# Patient Record
Sex: FEMALE | Race: OTHER | Employment: UNEMPLOYED | URBAN - METROPOLITAN AREA
[De-identification: names, ages, dates, MRNs, and addresses within clinical notes are randomized per-mention and may not be internally consistent; named-entity substitution may affect disease eponyms.]

---

## 2023-02-02 NOTE — PROGRESS NOTES
Malcolm Corona is a 79 y.o. female is seen on 2/3/2023 for Establish Care, Elevated Blood Pressure, and Fatigue    Assessment & Plan:     1. Dyspnea on exertion  Assessment & Plan:  Check chest PA and lateral  Check echo to r/o HF, worsening cardiac function  Orders:  -     CBC WITH AUTOMATED DIFF; Future  -     METABOLIC PANEL, COMPREHENSIVE; Future  -     ECHO ADULT COMPLETE; Future  -     XR CHEST PA LAT; Future  2. Chest pain, unspecified type  Assessment & Plan:  Check EKG and echo  Referral to cardiology  Orders:  -     EKG, 12 LEAD, INITIAL; Future  -     REFERRAL TO CARDIOLOGY  3. Swelling of lower leg  Assessment & Plan:  Continue current regimen  Check bilateral venous ultrasound to rule out PVD  Orders:  -     DUPLEX LOWER EXT VENOUS BILAT; Future  4. Essential hypertension  Assessment & Plan:  Elevated, goal <130/80  Increased lisinopril to 40 mg daily  Re-evaluate in 4 weeks  If remains elevated, may consider amlodipine 5 mg daily vs increasing coreg to 12.5 mg BID  Orders:  -     CBC WITH AUTOMATED DIFF; Future  -     lisinopriL (PRINIVIL, ZESTRIL) 40 mg tablet; Take 1 Tablet by mouth daily. , Normal, Disp-90 Tablet, R-1  5. Hyperlipidemia associated with type 2 diabetes mellitus ([facility] [address]  Assessment & Plan:  Goal <70  Continue current regimen  Check cmp and lipid panel  Orders:  -     LIPID PANEL; Future  6. Type 2 diabetes mellitus with hyperglycemia, without long-term current use of insulin (HCC)  Assessment & Plan:  Continue current regimen  Check a1c  Orders:  -     CBC WITH AUTOMATED DIFF; Future  -     HEMOGLOBIN A1C WITH EAG; Future  7. Vitamin D deficiency  Assessment & Plan:  Check vitamin D level  Orders:  -     VITAMIN D, 25 HYDROXY; Future  8. Need for hepatitis C screening test  -     HEPATITIS C AB; Future  9. Encounter to establish care    Follow-up and Dispositions    Return in about 4 weeks (around 3/3/2023) for BLOOD PRESSURE, DIABETES, LAB RESULTS, constipation, incontinence. Subjective:     HPI    Pt speaks the language, Sinhala the  services were used to conduct this visit.  number Sandy Patches. Previous PCP: was seeing doctor in [place], will stay awhile with son    Shortness of breath  Onset: been going on for 2 months  Increased when lay on back, feel difficulty breathing  Of course without laying back up  Feels like somebody's choking up  Has SOB with moving around  Aggravating factors: Hookah smoke, incense  Non-smoker  Has chest pain  Went to the ER and checked the heart and it was normal; a month ago    Chest pain  The pain radiates to the left pain  Chest pain: 4-5/10  Location: middle of chest, left side  Has a little bit of pain  History of MI: none    Feet are swollen  Taking lasix, feet are swollen  Eating a lot of salt  Treatment: 40 mg furosemide   Has not had US performed    Hypertension  Symptoms:  chest pain  BP readings at home are : did not check blood pressure at home;   Comorbid: HLD, DM  Current treatment: carvedilol 6.25 mg BID, furosemide 40 mg daily, lisinopril 20 mg daily    Hyperlipidemia  Compliant with meds: yes  Comorbid: HTN, DM  Current treatment: vascepa 1 gram daily, rosuvastatin 20 mg daily    Type 2 DM-  Home BG readings range from : was high  Hypoglycemia: sometimes  On statin: rosuvastatin 20 mg daily  Comorbid: HLD, HTN  Followed by endocrine: none  Current treatment: glimepiride 4 mg BID, insulin glargine 40 units daily, metformin 1000 mg BID    Vitamin D deficiency  Fatigue: yes  Weakness: no  Treatment: vitamin D3 50,000 units once a week    Review of Systems   Constitutional:  Negative for chills, fever and malaise/fatigue. Respiratory:  Positive for shortness of breath. Negative for cough. Cardiovascular:  Positive for chest pain, leg swelling and PND.      Objective:   BP (!) 171/80 Comment: left arm manual  Pulse 71   Temp 98.2 °F (36.8 °C) (Oral)   Resp 16   Ht 5' 4.5\" (1.638 m)   Wt 211 lb (95.7 kg)   SpO2 100%   BMI 35.66 kg/m²     Physical Exam  Vitals and nursing note reviewed. Constitutional:       General: She is not in acute distress. Appearance: She is not ill-appearing. HENT:      Head: Normocephalic and atraumatic. Cardiovascular:      Rate and Rhythm: Normal rate and regular rhythm. Pulmonary:      Effort: Pulmonary effort is normal. No respiratory distress. Breath sounds: No wheezing, rhonchi or rales. Musculoskeletal:      Right lower leg: No tenderness. 2+ Edema present. Left lower leg: No tenderness. 2+ Edema present. Right ankle: Swelling present. Normal pulse. Left ankle: Swelling present. Normal pulse. Skin:     General: Skin is warm and dry. Neurological:      General: No focal deficit present. Mental Status: She is alert and oriented to person, place, and time. Psychiatric:         Mood and Affect: Mood normal.         Thought Content:  Thought content normal.         Judgment: Judgment normal.      Total time spent: 60 minutes    ENOC Lombardi

## 2023-02-03 ENCOUNTER — OFFICE VISIT (OUTPATIENT)
Dept: FAMILY MEDICINE CLINIC | Age: 68
End: 2023-02-03
Payer: MEDICARE

## 2023-02-03 VITALS
DIASTOLIC BLOOD PRESSURE: 80 MMHG | TEMPERATURE: 98.2 F | SYSTOLIC BLOOD PRESSURE: 171 MMHG | BODY MASS INDEX: 35.16 KG/M2 | RESPIRATION RATE: 16 BRPM | HEIGHT: 65 IN | WEIGHT: 211 LBS | OXYGEN SATURATION: 100 % | HEART RATE: 71 BPM

## 2023-02-03 DIAGNOSIS — R07.9 CHEST PAIN, UNSPECIFIED TYPE: ICD-10-CM

## 2023-02-03 DIAGNOSIS — R06.09 DYSPNEA ON EXERTION: Primary | ICD-10-CM

## 2023-02-03 DIAGNOSIS — M79.89 SWELLING OF LOWER LEG: ICD-10-CM

## 2023-02-03 DIAGNOSIS — I10 ESSENTIAL HYPERTENSION: ICD-10-CM

## 2023-02-03 DIAGNOSIS — E11.69 HYPERLIPIDEMIA ASSOCIATED WITH TYPE 2 DIABETES MELLITUS (HCC): ICD-10-CM

## 2023-02-03 DIAGNOSIS — E55.9 VITAMIN D DEFICIENCY: ICD-10-CM

## 2023-02-03 DIAGNOSIS — Z76.89 ENCOUNTER TO ESTABLISH CARE: ICD-10-CM

## 2023-02-03 DIAGNOSIS — E78.5 HYPERLIPIDEMIA ASSOCIATED WITH TYPE 2 DIABETES MELLITUS (HCC): ICD-10-CM

## 2023-02-03 DIAGNOSIS — Z11.59 NEED FOR HEPATITIS C SCREENING TEST: ICD-10-CM

## 2023-02-03 DIAGNOSIS — E11.65 TYPE 2 DIABETES MELLITUS WITH HYPERGLYCEMIA, WITHOUT LONG-TERM CURRENT USE OF INSULIN (HCC): ICD-10-CM

## 2023-02-03 PROBLEM — I21.A1 TYPE 2 MI (MYOCARDIAL INFARCTION) (HCC): Status: ACTIVE | Noted: 2022-12-15

## 2023-02-03 PROBLEM — R06.02 SHORTNESS OF BREATH: Status: ACTIVE | Noted: 2023-02-03

## 2023-02-03 RX ORDER — GLIMEPIRIDE 4 MG/1
4 TABLET ORAL 2 TIMES DAILY
COMMUNITY

## 2023-02-03 RX ORDER — LANOLIN ALCOHOL/MO/W.PET/CERES
1000 CREAM (GRAM) TOPICAL DAILY
COMMUNITY

## 2023-02-03 RX ORDER — LISINOPRIL 20 MG/1
20 TABLET ORAL DAILY
COMMUNITY
End: 2023-02-03 | Stop reason: SDUPTHER

## 2023-02-03 RX ORDER — ASPIRIN 81 MG/1
81 TABLET ORAL DAILY
COMMUNITY

## 2023-02-03 RX ORDER — LISINOPRIL 40 MG/1
40 TABLET ORAL DAILY
Qty: 90 TABLET | Refills: 1 | Status: SHIPPED | OUTPATIENT
Start: 2023-02-03

## 2023-02-03 RX ORDER — CARVEDILOL 6.25 MG/1
TABLET ORAL 2 TIMES DAILY WITH MEALS
COMMUNITY

## 2023-02-03 RX ORDER — ICOSAPENT ETHYL 1000 MG/1
1 CAPSULE ORAL DAILY
COMMUNITY

## 2023-02-03 RX ORDER — FUROSEMIDE 40 MG/1
40 TABLET ORAL DAILY
COMMUNITY

## 2023-02-03 RX ORDER — ROSUVASTATIN CALCIUM 20 MG/1
20 TABLET, COATED ORAL DAILY
COMMUNITY

## 2023-02-03 RX ORDER — ASPIRIN 325 MG
50000 TABLET, DELAYED RELEASE (ENTERIC COATED) ORAL
COMMUNITY

## 2023-02-03 RX ORDER — OXYBUTYNIN CHLORIDE 5 MG/1
5 TABLET ORAL DAILY
COMMUNITY

## 2023-02-03 RX ORDER — ALBUTEROL SULFATE 90 UG/1
1-2 AEROSOL, METERED RESPIRATORY (INHALATION)
COMMUNITY
Start: 2022-12-19

## 2023-02-03 RX ORDER — METFORMIN HYDROCHLORIDE 1000 MG/1
1000 TABLET ORAL 2 TIMES DAILY WITH MEALS
COMMUNITY

## 2023-02-03 NOTE — ASSESSMENT & PLAN NOTE
Elevated, goal <130/80  Increased lisinopril to 40 mg daily  Re-evaluate in 4 weeks  If remains elevated, may consider amlodipine 5 mg daily vs increasing coreg to 12.5 mg BID

## 2023-02-03 NOTE — PROGRESS NOTES
Nino De Anda presents today for   Chief Complaint   Patient presents with    Establish Care    Elevated Blood Pressure    Fatigue       Is someone accompanying this pt? Janay Levy    Is the patient using any DME equipment during OV? no    Depression Screening:  3 most recent PHQ Screens 2/3/2023   Little interest or pleasure in doing things Not at all   Feeling down, depressed, irritable, or hopeless Not at all   Total Score PHQ 2 0       Learning Assessment:  Learning Assessment 2/3/2023   PRIMARY LEARNER Patient   PRIMARY LANGUAGE OTHER (COMMENT)   LEARNER PREFERENCE PRIMARY DEMONSTRATION   ANSWERED BY patient   RELATIONSHIP SELF       Abuse Screening:  Abuse Screening Questionnaire 2/3/2023   Do you ever feel afraid of your partner? N   Are you in a relationship with someone who physically or mentally threatens you? N   Is it safe for you to go home? Y       Fall Screening  Fall Risk Assessment, last 12 mths 2/3/2023   Able to walk? Yes   Fall in past 12 months? 0   Do you feel unsteady? 0   Are you worried about falling 0       Generalized Anxiety  No flowsheet data found. Health Maintenance Due   Topic Date Due    Hepatitis C Screening  Never done    Depression Screen  Never done    COVID-19 Vaccine (1) Never done    Lipid Screen  Never done    DTaP/Tdap/Td series (1 - Tdap) Never done    Colorectal Cancer Screening Combo  Never done    Shingles Vaccine (1 of 2) Never done    Breast Cancer Screen Mammogram  Never done    Bone Densitometry (Dexa) Screening  Never done    Pneumococcal 65+ years (1 - PCV) Never done    Flu Vaccine (1) Never done    Medicare Yearly Exam  Never done   . Health Maintenance reviewed and discussed and ordered per Provider. Advance Directive:  1. Do you have an advance directive in place?  Patient Reply:no

## 2023-02-04 ENCOUNTER — HOSPITAL ENCOUNTER (OUTPATIENT)
Dept: LAB | Age: 68
End: 2023-02-04
Payer: MEDICARE

## 2023-02-04 ENCOUNTER — HOSPITAL ENCOUNTER (OUTPATIENT)
Dept: GENERAL RADIOLOGY | Age: 68
End: 2023-02-04
Payer: MEDICARE

## 2023-02-04 DIAGNOSIS — R07.9 CHEST PAIN, UNSPECIFIED TYPE: ICD-10-CM

## 2023-02-04 DIAGNOSIS — I10 ESSENTIAL HYPERTENSION: ICD-10-CM

## 2023-02-04 DIAGNOSIS — Z11.59 NEED FOR HEPATITIS C SCREENING TEST: ICD-10-CM

## 2023-02-04 DIAGNOSIS — E11.69 HYPERLIPIDEMIA ASSOCIATED WITH TYPE 2 DIABETES MELLITUS (HCC): ICD-10-CM

## 2023-02-04 DIAGNOSIS — R06.09 DYSPNEA ON EXERTION: ICD-10-CM

## 2023-02-04 DIAGNOSIS — E78.5 HYPERLIPIDEMIA ASSOCIATED WITH TYPE 2 DIABETES MELLITUS (HCC): ICD-10-CM

## 2023-02-04 DIAGNOSIS — E11.65 TYPE 2 DIABETES MELLITUS WITH HYPERGLYCEMIA, WITHOUT LONG-TERM CURRENT USE OF INSULIN (HCC): ICD-10-CM

## 2023-02-04 DIAGNOSIS — E55.9 VITAMIN D DEFICIENCY: ICD-10-CM

## 2023-02-04 LAB
25(OH)D3 SERPL-MCNC: 43 NG/ML (ref 30–100)
ALBUMIN SERPL-MCNC: 3 G/DL (ref 3.4–5)
ALBUMIN/GLOB SERPL: 0.9 (ref 0.8–1.7)
ALP SERPL-CCNC: 51 U/L (ref 45–117)
ALT SERPL-CCNC: 24 U/L (ref 13–56)
ANION GAP SERPL CALC-SCNC: 3 MMOL/L (ref 3–18)
AST SERPL-CCNC: 23 U/L (ref 10–38)
ATRIAL RATE: 70 BPM
BASOPHILS # BLD: 0 K/UL (ref 0–0.1)
BASOPHILS NFR BLD: 1 % (ref 0–2)
BILIRUB SERPL-MCNC: 0.3 MG/DL (ref 0.2–1)
BUN SERPL-MCNC: 20 MG/DL (ref 7–18)
BUN/CREAT SERPL: 17 (ref 12–20)
CALCIUM SERPL-MCNC: 8.4 MG/DL (ref 8.5–10.1)
CALCULATED P AXIS, ECG09: 41 DEGREES
CALCULATED R AXIS, ECG10: 3 DEGREES
CALCULATED T AXIS, ECG11: 60 DEGREES
CHLORIDE SERPL-SCNC: 106 MMOL/L (ref 100–111)
CHOLEST SERPL-MCNC: 118 MG/DL
CO2 SERPL-SCNC: 30 MMOL/L (ref 21–32)
CREAT SERPL-MCNC: 1.17 MG/DL (ref 0.6–1.3)
DIAGNOSIS, 93000: NORMAL
DIFFERENTIAL METHOD BLD: ABNORMAL
EOSINOPHIL # BLD: 0.2 K/UL (ref 0–0.4)
EOSINOPHIL NFR BLD: 6 % (ref 0–5)
ERYTHROCYTE [DISTWIDTH] IN BLOOD BY AUTOMATED COUNT: 13.3 % (ref 11.6–14.5)
EST. AVERAGE GLUCOSE BLD GHB EST-MCNC: 246 MG/DL
GLOBULIN SER CALC-MCNC: 3.3 G/DL (ref 2–4)
GLUCOSE SERPL-MCNC: 226 MG/DL (ref 74–99)
HBA1C MFR BLD: 10.2 % (ref 4.2–5.6)
HCT VFR BLD AUTO: 30.8 % (ref 35–45)
HDLC SERPL-MCNC: 37 MG/DL (ref 40–60)
HDLC SERPL: 3.2 (ref 0–5)
HGB BLD-MCNC: 9.8 G/DL (ref 12–16)
IMM GRANULOCYTES # BLD AUTO: 0 K/UL (ref 0–0.04)
IMM GRANULOCYTES NFR BLD AUTO: 0 % (ref 0–0.5)
LDLC SERPL CALC-MCNC: 47 MG/DL (ref 0–100)
LIPID PROFILE,FLP: ABNORMAL
LYMPHOCYTES # BLD: 1.4 K/UL (ref 0.9–3.6)
LYMPHOCYTES NFR BLD: 34 % (ref 21–52)
MCH RBC QN AUTO: 25.3 PG (ref 24–34)
MCHC RBC AUTO-ENTMCNC: 31.8 G/DL (ref 31–37)
MCV RBC AUTO: 79.4 FL (ref 78–100)
MONOCYTES # BLD: 0.3 K/UL (ref 0.05–1.2)
MONOCYTES NFR BLD: 8 % (ref 3–10)
NEUTS SEG # BLD: 2 K/UL (ref 1.8–8)
NEUTS SEG NFR BLD: 51 % (ref 40–73)
NRBC # BLD: 0 K/UL (ref 0–0.01)
NRBC BLD-RTO: 0 PER 100 WBC
P-R INTERVAL, ECG05: 148 MS
PLATELET # BLD AUTO: 188 K/UL (ref 135–420)
PMV BLD AUTO: 11.2 FL (ref 9.2–11.8)
POTASSIUM SERPL-SCNC: 5.5 MMOL/L (ref 3.5–5.5)
PROT SERPL-MCNC: 6.3 G/DL (ref 6.4–8.2)
Q-T INTERVAL, ECG07: 398 MS
QRS DURATION, ECG06: 88 MS
QTC CALCULATION (BEZET), ECG08: 429 MS
RBC # BLD AUTO: 3.88 M/UL (ref 4.2–5.3)
SODIUM SERPL-SCNC: 139 MMOL/L (ref 136–145)
TRIGL SERPL-MCNC: 170 MG/DL (ref ?–150)
VENTRICULAR RATE, ECG03: 70 BPM
VLDLC SERPL CALC-MCNC: 34 MG/DL
WBC # BLD AUTO: 4 K/UL (ref 4.6–13.2)

## 2023-02-04 PROCEDURE — 85025 COMPLETE CBC W/AUTO DIFF WBC: CPT

## 2023-02-04 PROCEDURE — 93005 ELECTROCARDIOGRAM TRACING: CPT

## 2023-02-04 PROCEDURE — 71046 X-RAY EXAM CHEST 2 VIEWS: CPT

## 2023-02-04 PROCEDURE — 83036 HEMOGLOBIN GLYCOSYLATED A1C: CPT

## 2023-02-04 PROCEDURE — 86803 HEPATITIS C AB TEST: CPT

## 2023-02-04 PROCEDURE — 82306 VITAMIN D 25 HYDROXY: CPT

## 2023-02-04 PROCEDURE — 80061 LIPID PANEL: CPT

## 2023-02-04 PROCEDURE — 36415 COLL VENOUS BLD VENIPUNCTURE: CPT

## 2023-02-04 PROCEDURE — 80053 COMPREHEN METABOLIC PANEL: CPT

## 2023-02-06 LAB
HCV AB SER IA-ACNC: 0.02 INDEX
HCV AB SERPL QL IA: NEGATIVE
HCV COMMENT,HCGAC: NORMAL

## 2023-02-07 NOTE — PROGRESS NOTES
Please let pt know H/H is low. I referred pt to heme/onc. Start ferrous sulfate 325 mg daily until seen by heme/onc. Watch for s/s of bleeding, and avoid nsaids. Will review rest of labs at follow up appt on 3/3/23. Thank you!

## 2023-02-28 ENCOUNTER — TELEPHONE (OUTPATIENT)
Age: 68
End: 2023-02-28

## 2023-03-02 PROBLEM — N18.31 STAGE 3A CHRONIC KIDNEY DISEASE (HCC): Status: ACTIVE | Noted: 2023-03-02

## 2023-03-02 PROBLEM — D72.819 LEUKOPENIA: Status: ACTIVE | Noted: 2023-03-02

## 2023-03-02 PROBLEM — D64.9 ANEMIA: Status: ACTIVE | Noted: 2023-03-02

## 2023-03-02 RX ORDER — ALBUTEROL SULFATE 90 UG/1
2 AEROSOL, METERED RESPIRATORY (INHALATION) EVERY 4 HOURS
COMMUNITY
Start: 2022-12-20 | End: 2023-03-03 | Stop reason: ALTCHOICE

## 2023-03-02 RX ORDER — BENZONATATE 100 MG/1
1 CAPSULE ORAL EVERY 8 HOURS PRN
COMMUNITY
Start: 2022-12-20 | End: 2023-03-03 | Stop reason: ALTCHOICE

## 2023-03-02 RX ORDER — LANOLIN ALCOHOL/MO/W.PET/CERES
325 CREAM (GRAM) TOPICAL
COMMUNITY
Start: 2023-02-07 | End: 2023-03-03 | Stop reason: SDUPTHER

## 2023-03-02 RX ORDER — FUROSEMIDE 40 MG/1
40 TABLET ORAL PRN
COMMUNITY
Start: 2022-12-19 | End: 2023-03-03 | Stop reason: ALTCHOICE

## 2023-03-02 RX ORDER — CARVEDILOL 6.25 MG/1
1 TABLET ORAL DAILY
COMMUNITY
Start: 2023-01-27 | End: 2023-03-03 | Stop reason: ALTCHOICE

## 2023-03-02 RX ORDER — ROSUVASTATIN CALCIUM 20 MG/1
20 TABLET, COATED ORAL DAILY
COMMUNITY

## 2023-03-02 RX ORDER — LISINOPRIL 40 MG/1
1 TABLET ORAL DAILY
COMMUNITY
Start: 2023-02-03

## 2023-03-02 RX ORDER — OXYBUTYNIN CHLORIDE 5 MG/1
5 TABLET ORAL DAILY
COMMUNITY

## 2023-03-02 RX ORDER — ASPIRIN 81 MG/1
81 TABLET ORAL DAILY
COMMUNITY
End: 2023-03-03 | Stop reason: ALTCHOICE

## 2023-03-02 RX ORDER — GLIMEPIRIDE 4 MG/1
4 TABLET ORAL 2 TIMES DAILY
COMMUNITY
End: 2023-03-03 | Stop reason: ALTCHOICE

## 2023-03-02 RX ORDER — SENNOSIDES 8.6 MG/1
TABLET, COATED ORAL
COMMUNITY
Start: 2022-12-20

## 2023-03-02 RX ORDER — ICOSAPENT ETHYL 1000 MG/1
1 CAPSULE ORAL DAILY
COMMUNITY
End: 2023-03-03 | Stop reason: ALTCHOICE

## 2023-03-02 ASSESSMENT — ENCOUNTER SYMPTOMS: SHORTNESS OF BREATH: 0

## 2023-03-02 NOTE — ASSESSMENT & PLAN NOTE
LDL wnl, goal <70  Elevated triglycerides, advised pt on lifestyle modifications  Recheck lipid panel in 3 months

## 2023-03-02 NOTE — PROGRESS NOTES
Chief Complaint   Patient presents with    Follow-up    Edema     Fluid, Loss of kidney function    Anxiety    Dizziness     All the time     Assessment & Plan:     1. Essential hypertension  Assessment & Plan:  Elevated, goal < 130/80  Start amlodipine 5 mg daily  Continue carvedilol 6.25 mg BID, and lisinopril 40 mg daily  Possible medication compliance issue. Advised pt to take medications as prescribed. Re-evaluate in 1 week. If no improvement, may increase amlodipine to 10 mg daily  Advised pt and son to go to the emergency room if BP readings are over 180/120. Orders:  -     Comprehensive Metabolic Panel; Future  -     amLODIPine (NORVASC) 5 MG tablet; Take 1 tablet by mouth daily, Disp-90 tablet, R-1Normal  2. Stage 3a chronic kidney disease ([facility] [address]  Assessment & Plan:  Advised pt to avoid NSAIDS and increase hydration  Recheck cmp in 3 months  Orders:  -     CBC; Future  3. Hyperlipidemia associated with type 2 diabetes mellitus ([facility] [address]  Assessment & Plan:  LDL wnl, goal <70  Elevated triglycerides, advised pt on lifestyle modifications  Recheck lipid panel in 3 months  4. Type 2 diabetes mellitus with hyperglycemia, without long-term current use of insulin (HCC)  Assessment & Plan:  A1c elevated  Issues with medication compliance, advised pt on medication compliance  Continue current regimen  Referral to pharmD for management  Orders:  -     Microalbumin / Creatinine Urine Ratio; Future  -     Hemoglobin A1C; Future  -     [facility] - Pharmacist HR Facilities-Noel Mari ([facility])  5. Anemia, unspecified type  Assessment & Plan:  Continue ferrous sulfate 325 mg daily  Recheck cbc in 3 months  Orders:  -     CBC; Future  -     ferrous sulfate (FE TABS 325) 325 (65 Fe) MG EC tablet; Take 1 tablet by mouth every morning (before breakfast), Disp-90 tablet, R-1Normal  6. Encounter to discuss test results    Follow-up and Dispositions    Return in about 1 week (around 3/10/2023) for blood pressure.     Subjective:     HPI     Pt speaks the language, Italian, the  services were used to conduct this visit.  number #[id number], Ziad.    Hypertension  Symptoms:  no chest pain, occasionally dizziness, If she bends over, or lays down on her back, feels dizzy  BP readings at home are : 's at home  Comorbid: HLD, DM  Current treatment: carvedilol 6.25 mg BID, furosemide 40 mg daily PRN, lisinopril 40 mg daily    CKD  Stage of Chronic Kidney Disease III   Denies any BLE swelling, SOB, chest pain  NSAID use: none  Risk factors/Comorbid: none  Followed by nephrology: when was in [place], saw a kidney doctor, had in infection to her kidneys  When took diuretic pills, states she had an infection in her kidney    Hyperlipidemia  Compliant with meds: yes  Comorbid: HTN, DM  Current treatment: vascepa 1 gram daily, rosuvastatin 20 mg daily  Hypertriglyceridemia    Type 2 DM  Home BG readings range from : blood sugar was 80 today  Hypoglycemia: sometimes  On statin: rosuvastatin 20 mg daily  Comorbid: HLD, HTN  Followed by endocrine: none  Current treatment: glimepiride 4 mg BID, insulin glargine 40 units daily (but states she does not take it every day), states blood sugar was dropping with insulin, metformin 1000 mg BID (is taking metformin once day)   States when she's taking her pills and taking insulin her blood sugar drops, drops to 70's    Anemia  Current symptoms:  No bleeding  Fatigue: yes  Treatment: ferrous sulfate 325 mg daily    Review of Systems   Respiratory:  Negative for shortness of breath.    Cardiovascular:  Negative for chest pain and leg swelling.   Neurological:  Positive for dizziness. Negative for light-headedness and headaches.     Objective:     Vitals:    03/03/23 1117 03/03/23 1139 03/03/23 1150   BP: (!) 208/74 (!) 182/84 (!) 191/75   Site: Right Upper Arm Left Upper Arm    Position: Sitting Sitting    Cuff Size: Medium Adult Medium Adult    Pulse: 77     Resp: 16    Temp: 98.1 °F (36.7 °C)     TempSrc: Oral     SpO2: 100%     Weight: 220 lb (99.8 kg)     Height: 5' 4\" (1.626 m)       Physical Exam  Vitals and nursing note reviewed. Constitutional:       General: She is not in acute distress. Appearance: She is not ill-appearing. HENT:      Head: Normocephalic and atraumatic. Cardiovascular:      Rate and Rhythm: Normal rate and regular rhythm. Pulmonary:      Effort: Pulmonary effort is normal. No respiratory distress. Breath sounds: No wheezing, rhonchi or rales. Musculoskeletal:         General: Normal range of motion. Skin:     General: Skin is warm and dry. Neurological:      General: No focal deficit present. Mental Status: She is alert. Psychiatric:         Mood and Affect: Mood normal.         Thought Content:  Thought content normal.         Judgment: Judgment normal.     Total time spent: 45 minutes    ANNIKA Grant

## 2023-03-02 NOTE — ASSESSMENT & PLAN NOTE
Elevated, goal < 130/80  Start amlodipine 5 mg daily  Continue carvedilol 6.25 mg BID, and lisinopril 40 mg daily  Possible medication compliance issue. Advised pt to take medications as prescribed. Re-evaluate in 1 week. If no improvement, may increase amlodipine to 10 mg daily  Advised pt and son to go to the emergency room if BP readings are over 180/120.

## 2023-03-02 NOTE — ASSESSMENT & PLAN NOTE
A1c elevated  Issues with medication compliance, advised pt on medication compliance  Continue current regimen  Referral to pharmD for management   36.9

## 2023-03-03 ENCOUNTER — HOSPITAL ENCOUNTER (OUTPATIENT)
Facility: HOSPITAL | Age: 68
End: 2023-03-03
Payer: COMMERCIAL

## 2023-03-03 ENCOUNTER — OFFICE VISIT (OUTPATIENT)
Age: 68
End: 2023-03-03
Payer: COMMERCIAL

## 2023-03-03 ENCOUNTER — HOSPITAL ENCOUNTER (OUTPATIENT)
Facility: HOSPITAL | Age: 68
Setting detail: SPECIMEN
End: 2023-03-03
Payer: COMMERCIAL

## 2023-03-03 VITALS
RESPIRATION RATE: 16 BRPM | HEIGHT: 64 IN | SYSTOLIC BLOOD PRESSURE: 191 MMHG | DIASTOLIC BLOOD PRESSURE: 75 MMHG | OXYGEN SATURATION: 100 % | BODY MASS INDEX: 37.56 KG/M2 | WEIGHT: 220 LBS | HEART RATE: 77 BPM | TEMPERATURE: 98.1 F

## 2023-03-03 DIAGNOSIS — I10 ESSENTIAL HYPERTENSION: Primary | ICD-10-CM

## 2023-03-03 DIAGNOSIS — N18.31 STAGE 3A CHRONIC KIDNEY DISEASE (HCC): ICD-10-CM

## 2023-03-03 DIAGNOSIS — Z71.2 ENCOUNTER TO DISCUSS TEST RESULTS: ICD-10-CM

## 2023-03-03 DIAGNOSIS — E78.5 HYPERLIPIDEMIA ASSOCIATED WITH TYPE 2 DIABETES MELLITUS (HCC): ICD-10-CM

## 2023-03-03 DIAGNOSIS — E11.65 TYPE 2 DIABETES MELLITUS WITH HYPERGLYCEMIA, WITHOUT LONG-TERM CURRENT USE OF INSULIN (HCC): ICD-10-CM

## 2023-03-03 DIAGNOSIS — E11.69 HYPERLIPIDEMIA ASSOCIATED WITH TYPE 2 DIABETES MELLITUS (HCC): ICD-10-CM

## 2023-03-03 DIAGNOSIS — D64.9 ANEMIA, UNSPECIFIED TYPE: ICD-10-CM

## 2023-03-03 LAB
ALBUMIN SERPL-MCNC: 3 G/DL (ref 3.4–5)
ALBUMIN/GLOB SERPL: 0.9 (ref 0.8–1.7)
ALP SERPL-CCNC: 57 U/L (ref 45–117)
ALT SERPL-CCNC: 28 U/L (ref 13–56)
ANION GAP SERPL CALC-SCNC: 3 MMOL/L (ref 3–18)
AST SERPL-CCNC: 36 U/L (ref 10–38)
BILIRUB SERPL-MCNC: 0.2 MG/DL (ref 0.2–1)
BUN SERPL-MCNC: 21 MG/DL (ref 7–18)
BUN/CREAT SERPL: 16 (ref 12–20)
CALCIUM SERPL-MCNC: 8.6 MG/DL (ref 8.5–10.1)
CHLORIDE SERPL-SCNC: 105 MMOL/L (ref 100–111)
CO2 SERPL-SCNC: 30 MMOL/L (ref 21–32)
CREAT SERPL-MCNC: 1.29 MG/DL (ref 0.6–1.3)
CREAT UR-MCNC: 54 MG/DL (ref 30–125)
ERYTHROCYTE [DISTWIDTH] IN BLOOD BY AUTOMATED COUNT: 13.4 % (ref 11.6–14.5)
EST. AVERAGE GLUCOSE BLD GHB EST-MCNC: 235 MG/DL
GLOBULIN SER CALC-MCNC: 3.3 G/DL (ref 2–4)
GLUCOSE SERPL-MCNC: 347 MG/DL (ref 74–99)
HBA1C MFR BLD: 9.8 % (ref 4.2–5.6)
HCT VFR BLD AUTO: 31.5 % (ref 35–45)
HGB BLD-MCNC: 9.9 G/DL (ref 12–16)
MCH RBC QN AUTO: 24.7 PG (ref 24–34)
MCHC RBC AUTO-ENTMCNC: 31.4 G/DL (ref 31–37)
MCV RBC AUTO: 78.6 FL (ref 78–100)
MICROALBUMIN UR-MCNC: 245 MG/DL (ref 0–3)
MICROALBUMIN/CREAT UR-RTO: 4537 MG/G (ref 0–30)
NRBC # BLD: 0 K/UL (ref 0–0.01)
NRBC BLD-RTO: 0 PER 100 WBC
PLATELET # BLD AUTO: 200 K/UL (ref 135–420)
PMV BLD AUTO: 11.7 FL (ref 9.2–11.8)
POTASSIUM SERPL-SCNC: 5.5 MMOL/L (ref 3.5–5.5)
PROT SERPL-MCNC: 6.3 G/DL (ref 6.4–8.2)
RBC # BLD AUTO: 4.01 M/UL (ref 4.2–5.3)
SODIUM SERPL-SCNC: 138 MMOL/L (ref 136–145)
WBC # BLD AUTO: 4.7 K/UL (ref 4.6–13.2)

## 2023-03-03 PROCEDURE — 85027 COMPLETE CBC AUTOMATED: CPT

## 2023-03-03 PROCEDURE — 3078F DIAST BP <80 MM HG: CPT

## 2023-03-03 PROCEDURE — 36415 COLL VENOUS BLD VENIPUNCTURE: CPT

## 2023-03-03 PROCEDURE — 99215 OFFICE O/P EST HI 40 MIN: CPT

## 2023-03-03 PROCEDURE — 1123F ACP DISCUSS/DSCN MKR DOCD: CPT

## 2023-03-03 PROCEDURE — 80053 COMPREHEN METABOLIC PANEL: CPT

## 2023-03-03 PROCEDURE — 82570 ASSAY OF URINE CREATININE: CPT

## 2023-03-03 PROCEDURE — 3046F HEMOGLOBIN A1C LEVEL >9.0%: CPT

## 2023-03-03 PROCEDURE — 83036 HEMOGLOBIN GLYCOSYLATED A1C: CPT

## 2023-03-03 PROCEDURE — 3077F SYST BP >= 140 MM HG: CPT

## 2023-03-03 RX ORDER — CARVEDILOL 6.25 MG/1
6.25 TABLET ORAL 2 TIMES DAILY WITH MEALS
COMMUNITY

## 2023-03-03 RX ORDER — GLIMEPIRIDE 4 MG/1
4 TABLET ORAL
COMMUNITY

## 2023-03-03 RX ORDER — LANOLIN ALCOHOL/MO/W.PET/CERES
1000 CREAM (GRAM) TOPICAL DAILY
COMMUNITY

## 2023-03-03 RX ORDER — ASPIRIN 81 MG/1
81 TABLET ORAL DAILY
COMMUNITY

## 2023-03-03 RX ORDER — AMLODIPINE BESYLATE 5 MG/1
5 TABLET ORAL DAILY
Qty: 90 TABLET | Refills: 1 | Status: SHIPPED | OUTPATIENT
Start: 2023-03-03

## 2023-03-03 RX ORDER — LANOLIN ALCOHOL/MO/W.PET/CERES
325 CREAM (GRAM) TOPICAL
Qty: 90 TABLET | Refills: 1 | Status: SHIPPED | OUTPATIENT
Start: 2023-03-03

## 2023-03-03 SDOH — ECONOMIC STABILITY: FOOD INSECURITY: WITHIN THE PAST 12 MONTHS, YOU WORRIED THAT YOUR FOOD WOULD RUN OUT BEFORE YOU GOT MONEY TO BUY MORE.: NEVER TRUE

## 2023-03-03 SDOH — ECONOMIC STABILITY: INCOME INSECURITY: HOW HARD IS IT FOR YOU TO PAY FOR THE VERY BASICS LIKE FOOD, HOUSING, MEDICAL CARE, AND HEATING?: NOT HARD AT ALL

## 2023-03-03 SDOH — ECONOMIC STABILITY: HOUSING INSECURITY
IN THE LAST 12 MONTHS, WAS THERE A TIME WHEN YOU DID NOT HAVE A STEADY PLACE TO SLEEP OR SLEPT IN A SHELTER (INCLUDING NOW)?: NO

## 2023-03-03 SDOH — ECONOMIC STABILITY: FOOD INSECURITY: WITHIN THE PAST 12 MONTHS, THE FOOD YOU BOUGHT JUST DIDN'T LAST AND YOU DIDN'T HAVE MONEY TO GET MORE.: NEVER TRUE

## 2023-03-03 ASSESSMENT — PATIENT HEALTH QUESTIONNAIRE - PHQ9
SUM OF ALL RESPONSES TO PHQ QUESTIONS 1-9: 1
2. FEELING DOWN, DEPRESSED OR HOPELESS: 0
SUM OF ALL RESPONSES TO PHQ QUESTIONS 1-9: 1
SUM OF ALL RESPONSES TO PHQ9 QUESTIONS 1 & 2: 1
1. LITTLE INTEREST OR PLEASURE IN DOING THINGS: 1

## 2023-03-03 NOTE — PROGRESS NOTES
Chief Complaint   Patient presents with    Follow-up    Edema     Fluid, Loss of kidney function    Anxiety    Dizziness     All the time

## 2023-03-13 RX ORDER — CARVEDILOL 6.25 MG/1
6.25 TABLET ORAL 2 TIMES DAILY WITH MEALS
Qty: 180 TABLET | Refills: 1 | Status: SHIPPED | OUTPATIENT
Start: 2023-03-13 | End: 2023-03-15 | Stop reason: SDUPTHER

## 2023-03-13 NOTE — TELEPHONE ENCOUNTER
Pt is in need a of a refill for carvedilol 6.25mg  Pt is staying with daughter in [place] please use [facility] at [address] their phone # is [phone]  Please advise

## 2023-03-14 PROBLEM — R79.89 ELEVATED BRAIN NATRIURETIC PEPTIDE (BNP) LEVEL: Status: ACTIVE | Noted: 2023-03-14

## 2023-03-14 PROBLEM — R93.89 ENDOMETRIAL THICKENING ON ULTRASOUND: Status: ACTIVE | Noted: 2023-03-14

## 2023-03-14 RX ORDER — CARVEDILOL 6.25 MG/1
6.25 TABLET ORAL 2 TIMES DAILY WITH MEALS
Qty: 180 TABLET | Refills: 1 | Status: CANCELLED | OUTPATIENT
Start: 2023-03-14

## 2023-03-14 RX ORDER — AMLODIPINE BESYLATE 10 MG/1
TABLET ORAL
COMMUNITY
Start: 2023-03-09

## 2023-03-14 RX ORDER — FUROSEMIDE 40 MG/1
TABLET ORAL
COMMUNITY
Start: 2023-03-10

## 2023-03-14 ASSESSMENT — ENCOUNTER SYMPTOMS: SHORTNESS OF BREATH: 0

## 2023-03-14 NOTE — ASSESSMENT & PLAN NOTE
Elevated, goal <130/80  Increased carvedilol to 12.5 mg BID  Re-evaluate in 4 weeks  If no improvement, may increase carvedilol to 25 mg BID

## 2023-03-14 NOTE — PROGRESS NOTES
Transitional Care Management Progress Note    Patient: Nora Alexander  : 1955  PCP: ANNIKA Burt    Date of admission: 3/4/23  Date of discharge: 3/9/23    Patient was contacted by Transitional Care Management services within two days after her discharge: no. This encounter and supporting documentation was reviewed if available. Medication reconciliation was performed today (3/15/2023). Assessment/Plan:     1. Chest pain, unspecified type  Assessment & Plan:  Continue management per cardiology  2. Essential hypertension  Assessment & Plan:  Elevated, goal <130/80  Increased carvedilol to 12.5 mg BID  Re-evaluate in 4 weeks  If no improvement, may increase carvedilol to 25 mg BID  Orders:  -     carvedilol (COREG) 6.25 MG tablet; Take 2 tablets by mouth 2 times daily (with meals), Disp-180 tablet, R-1Normal  3. Stage 3a chronic kidney disease ([facility] [address]  Assessment & Plan:  Continue management per nephrology  Advised pt to avoid NSAIDS and increase hydration  4. Endometrial thickening on ultrasound  Assessment & Plan:  Referral to GYN for management  Orders:  -     Amb External Referral To Gynecology  5. UTI symptoms  Assessment & Plan:  Check UA to r/o UTI  Orders:  -     AMB POC URINALYSIS DIP STICK AUTO W/ MICRO  -     Urinalysis with Microscopic; Future  6. Anemia, unspecified type  Assessment & Plan:  Start ferrous sulfate 325 mg daily  Management per heme/onc  Orders:  -     ferrous sulfate (FE TABS 325) 325 (65 Fe) MG EC tablet; Take 1 tablet by mouth every morning (before breakfast), Disp-90 tablet, R-1Normal  7. Type 2 diabetes mellitus with hyperglycemia, without long-term current use of insulin (HCC)  Assessment & Plan:  Continue current regimen  Referral to pharmD for management  Orders:  [facility] - Pharmacist HR Facilities-Noel Mari ([facility])  8.  Hospital discharge follow-up    Follow-up and Dispositions    Return in about 4 weeks (around 2023) for blood pressure, endometrial wall thickening * NEEDS 45 MINUTES for *. Subjective: Marilee Rodriguez is a 79 y.o. female presenting today for follow-up after being discharged from [facility].  The discharge summary was reviewed or requested. The main problem requiring admission was Chest pain, Hypertension. Pt speaks the language, Maori, the  services were used to conduct this visit.  number [id number]. Hospital Course Per Discharge Summary:  Jacob Galvez is a 79 y.o. female with PMH of HTN, HLD, DM2 who presented to the ED with elevated BP, dizziness, SOB, CP. Daughter at bedside acts as  for pt as pt doesn't speak Liz Failing. Pt recently moved from [place]. Daughter states that pt has been having dizziness, CP, SOB, and LE edema for the past 3 weeks. She states that pt was seen by her PCP recently and had her BP meds increased but they aren't sure which one. She states that pt was on Lasix but her renal function worsened so they took her off of it. She states that the CP comes and goes and describes it as midsternal pressure that is nonradiating. SOB with exertion and worsening LE edema over the past week. She denies n/v/d, cough, fever, chills, urinary symptoms. She was referred to IM for further evaluation and treatment  Patient managed conservatively in the hospital with antiplatelet and high intensity statin and heparin refused because of Sabianist reasons. Patient underwent nuclear stress test abnormal  Likely etiology initially was considered to be related to uncontrolled hypertension managed uncontrolled hypertension. Patient underwent cardiac catheterization and did not show any obstructive coronary artery disease.  Patient mild worsening creatinine likely related to diuretic induced versus contrast-induced nephropathy nephrology on board okay discharge home with instruction to follow-up with PCP in 1 week to repeat kidney function  Hold diuretic for couple of more days and resume on 3/13/2023  During hospital ultrasound KUB done and shows incidental finding of endometrial wall thickening recommend outpatient follow-up with GYN in the office for pelvic ultrasound and possible biopsy if indicated. Other comorbid condition managed conservatively in the hospital. Patient afebrile hemodynamically okay ready discharge home with instruction to follow-up PCP nephrology and cardiology in the office. Interval history/Current status: Patient presents today for hospital follow-up. Admitting symptoms have: not changed. States she is feeling dizziness no matter whether she's sitting or standing, it's always there. States she does not have any chest pain. Hypertension: States she took all her medications today. States she does not know why her blood pressure is high, does not eat salty foods    UTI symptoms: burning with urination, using diaper, has urgency with urination, has no control over it. New Medications at Discharge:  Amlodipine 10 mg daily    Changed Medications at Discharge:  Furosemide 40 mg Tabs  Start date: March 13, 2023  Take 1 Tab by Mouth Once a Day. LisinopriL 40 mg Tabs  Take 1 Tab by Mouth Once a Day. Continued Medications at Discharge:  albuterol 90 mcg/actuation Hfaa inhaler  Take 1-2 Puffs inhaled by mouth Every 6 Hours As Needed for Wheezing or Shortness of Breath (wheeze). aspirin 81 mg Chew  Take 1 Tab by Mouth Once a Day. Basaglar KwikPen U-100 Insulin 100 unit/mL (3 mL) insulin pen  Inject 40 Units beneath the skin Every Morning. Generic drug: Insulin Glargine    carvediloL 6.25 mg Tabs  Take 6.25 mg by Mouth twice a Day. cyanocobalamin 1,000 mcg Tabs  Take 1,000 mcg by Mouth Once a Day. glimepiride 4 mg Tabs  Take 4 mg by Mouth Once a Day. metFORMIN 1,000 mg Tabs  Take 1,000 mg by Mouth Once a Day. oxybutynin 5 mg Tabs  Take 5 mg by Mouth Once a Day.     rosuvastatin 20 mg Tabs  Take 20 mg by Mouth Every Night at Bedtime. Discontinued Medications at Discharge:  guaiFENesin 100 mg/5 mL Liqd  Commonly known as: Robitussin    Recommended Follow-up:  Nephrology: will make appt  Cardiology: yes, has appt next month  GYN: ordered referral to GYN for endometrial wall thickening    Review of Systems   Respiratory:  Negative for shortness of breath. Cardiovascular:  Negative for chest pain and leg swelling. Genitourinary:  Positive for dysuria, frequency and urgency. Neurological:  Positive for dizziness. Negative for light-headedness and headaches. Objective:     Vitals:    03/15/23 1453 03/15/23 1454 03/15/23 1527   BP: (!) 192/75 (!) 194/78 (!) 178/68   Site: Left Upper Arm Right Upper Arm    Position: Sitting Sitting    Cuff Size: Small Adult Medium Adult    Pulse: 88     Resp: 16     Temp: 98.4 °F (36.9 °C)     TempSrc: Oral     SpO2: 100%     Weight: 213 lb 9.6 oz (96.9 kg)     Height: 5' 4\" (1.626 m)       Physical Exam  Vitals and nursing note reviewed. Constitutional:       General: She is not in acute distress. Appearance: She is not ill-appearing. HENT:      Head: Normocephalic and atraumatic. Cardiovascular:      Rate and Rhythm: Normal rate and regular rhythm. Pulmonary:      Effort: Pulmonary effort is normal. No respiratory distress. Breath sounds: No wheezing, rhonchi or rales. Musculoskeletal:         General: Normal range of motion. Skin:     General: Skin is warm and dry. Neurological:      General: No focal deficit present. Mental Status: She is alert. Psychiatric:         Mood and Affect: Mood normal.         Thought Content:  Thought content normal.         Judgment: Judgment normal.      ANNIKA Turk

## 2023-03-15 ENCOUNTER — OFFICE VISIT (OUTPATIENT)
Age: 68
End: 2023-03-15
Payer: COMMERCIAL

## 2023-03-15 VITALS
HEIGHT: 64 IN | WEIGHT: 213.6 LBS | TEMPERATURE: 98.4 F | HEART RATE: 88 BPM | RESPIRATION RATE: 16 BRPM | SYSTOLIC BLOOD PRESSURE: 178 MMHG | BODY MASS INDEX: 36.47 KG/M2 | OXYGEN SATURATION: 100 % | DIASTOLIC BLOOD PRESSURE: 68 MMHG

## 2023-03-15 DIAGNOSIS — Z09 HOSPITAL DISCHARGE FOLLOW-UP: ICD-10-CM

## 2023-03-15 DIAGNOSIS — N18.31 STAGE 3A CHRONIC KIDNEY DISEASE (HCC): ICD-10-CM

## 2023-03-15 DIAGNOSIS — R93.89 ENDOMETRIAL THICKENING ON ULTRASOUND: ICD-10-CM

## 2023-03-15 DIAGNOSIS — I10 ESSENTIAL HYPERTENSION: ICD-10-CM

## 2023-03-15 DIAGNOSIS — R39.9 UTI SYMPTOMS: ICD-10-CM

## 2023-03-15 DIAGNOSIS — D64.9 ANEMIA, UNSPECIFIED TYPE: ICD-10-CM

## 2023-03-15 DIAGNOSIS — E11.65 TYPE 2 DIABETES MELLITUS WITH HYPERGLYCEMIA, WITHOUT LONG-TERM CURRENT USE OF INSULIN (HCC): ICD-10-CM

## 2023-03-15 DIAGNOSIS — R07.9 CHEST PAIN, UNSPECIFIED TYPE: Primary | ICD-10-CM

## 2023-03-15 PROCEDURE — 99215 OFFICE O/P EST HI 40 MIN: CPT

## 2023-03-15 PROCEDURE — 3074F SYST BP LT 130 MM HG: CPT

## 2023-03-15 PROCEDURE — 3046F HEMOGLOBIN A1C LEVEL >9.0%: CPT

## 2023-03-15 PROCEDURE — 3078F DIAST BP <80 MM HG: CPT

## 2023-03-15 PROCEDURE — 1123F ACP DISCUSS/DSCN MKR DOCD: CPT

## 2023-03-15 RX ORDER — CARVEDILOL 6.25 MG/1
12.5 TABLET ORAL 2 TIMES DAILY WITH MEALS
Qty: 180 TABLET | Refills: 1 | Status: SHIPPED | OUTPATIENT
Start: 2023-03-15

## 2023-03-15 RX ORDER — CARVEDILOL 6.25 MG/1
6.25 TABLET ORAL 2 TIMES DAILY WITH MEALS
Qty: 180 TABLET | Refills: 1 | Status: CANCELLED | OUTPATIENT
Start: 2023-03-15

## 2023-03-15 RX ORDER — LANOLIN ALCOHOL/MO/W.PET/CERES
325 CREAM (GRAM) TOPICAL
Qty: 90 TABLET | Refills: 1 | Status: SHIPPED | OUTPATIENT
Start: 2023-03-15

## 2023-03-15 NOTE — PROGRESS NOTES
Chief Complaint   Patient presents with    Follow-up    Dizziness     Lightheaded, worsening    Nausea     Catherization, angioplasty.  Imbalanced    Urinary Frequency     Incontinence, burning with urination     Other     Wants referral to ob/gyn

## 2023-03-16 PROBLEM — R39.9 UTI SYMPTOMS: Status: ACTIVE | Noted: 2023-03-16

## 2023-03-27 ENCOUNTER — TELEPHONE (OUTPATIENT)
Facility: CLINIC | Age: 68
End: 2023-03-27

## 2023-04-18 DIAGNOSIS — E11.65 TYPE 2 DIABETES MELLITUS WITH HYPERGLYCEMIA, WITHOUT LONG-TERM CURRENT USE OF INSULIN (HCC): Primary | ICD-10-CM

## 2023-04-18 RX ORDER — OXYBUTYNIN CHLORIDE 5 MG/1
TABLET ORAL
Qty: 30 TABLET | Refills: 0 | Status: SHIPPED | OUTPATIENT
Start: 2023-04-18

## 2023-04-18 NOTE — TELEPHONE ENCOUNTER
Pt is out of her meds for  Metformin 1000mcg  And  Oxybutynin 5mg  Please advise  Last appt was 03/16/23  No return appt scheduled

## 2023-05-03 ENCOUNTER — TELEPHONE (OUTPATIENT)
Facility: CLINIC | Age: 68
End: 2023-05-03

## 2023-05-03 DIAGNOSIS — Z12.31 ENCOUNTER FOR SCREENING MAMMOGRAM FOR BREAST CANCER: Primary | ICD-10-CM

## 2023-05-03 NOTE — TELEPHONE ENCOUNTER
----- Message from Roby Guevara sent at 5/3/2023 10:40 AM EDT -----  Subject: Referral Request    Reason for referral request? Daughter, Tila Duque, is calling and she is   wanting to have a mammogram put in for her mother. She stated that she has   had one before but it has been a long time ago. She is not sure where to   go or when she would need to get this done. Please call her back once this   referral is in. Thank you. Provider patient wants to be referred to(if known):     Provider Phone Number(if known):     Additional Information for Provider?   ---------------------------------------------------------------------------  --------------  [id number] [employer]    [phone]; OK to leave message on voicemail  ---------------------------------------------------------------------------  --------------

## 2023-05-03 NOTE — TELEPHONE ENCOUNTER
Pts daughter is requesting an order gets placed for mammo. Pt last seen 3/15/23.  Pt has appt 5/8/23 for routine

## 2023-05-03 NOTE — TELEPHONE ENCOUNTER
----- Message from Boubacar Fajardo sent at 5/3/2023 10:45 AM EDT -----  Subject: Message to Provider    QUESTIONS  Information for Provider? Patient will need an  for the   appointment on 05/08, and the language is Yi. Thank you.   ---------------------------------------------------------------------------  --------------  Jamison Duane INFO  [phone]; OK to leave message on voicemail  ---------------------------------------------------------------------------  --------------  SCRIPT ANSWERS  Relationship to Patient? Other/Third Party  Representative Name? Daughter  Is the representative on the Communication Release of Information (DREAD)   form in Epic?  Yes

## 2023-05-03 NOTE — TELEPHONE ENCOUNTER
----- Message from Bijan Umaña sent at 5/3/2023 10:40 AM EDT -----  Subject: Referral Request    Reason for referral request? Daughter, Estefania Dupont, is calling and she is   wanting to have a mammogram put in for her mother. She stated that she has   had one before but it has been a long time ago. She is not sure where to   go or when she would need to get this done. Please call her back once this   referral is in. Thank you. Provider patient wants to be referred to(if known):     Provider Phone Number(if known):     Additional Information for Provider?   ---------------------------------------------------------------------------  --------------  [address] [facility]    [phone]; OK to leave message on voicemail  ---------------------------------------------------------------------------  --------------

## 2023-05-03 NOTE — TELEPHONE ENCOUNTER
Spoke w/ Talat Chase (margarett) and informed her of mammo order placed and was given central scheduling contact info.

## 2023-05-05 ASSESSMENT — ENCOUNTER SYMPTOMS: SHORTNESS OF BREATH: 0

## 2023-05-08 ENCOUNTER — OFFICE VISIT (OUTPATIENT)
Facility: CLINIC | Age: 68
End: 2023-05-08
Payer: COMMERCIAL

## 2023-05-08 VITALS
OXYGEN SATURATION: 100 % | TEMPERATURE: 97.2 F | RESPIRATION RATE: 16 BRPM | BODY MASS INDEX: 37.22 KG/M2 | WEIGHT: 218 LBS | SYSTOLIC BLOOD PRESSURE: 144 MMHG | HEIGHT: 64 IN | DIASTOLIC BLOOD PRESSURE: 75 MMHG | HEART RATE: 77 BPM

## 2023-05-08 DIAGNOSIS — E11.69 HYPERLIPIDEMIA ASSOCIATED WITH TYPE 2 DIABETES MELLITUS (HCC): ICD-10-CM

## 2023-05-08 DIAGNOSIS — R35.0 URINARY FREQUENCY: Primary | ICD-10-CM

## 2023-05-08 DIAGNOSIS — E11.65 TYPE 2 DIABETES MELLITUS WITH HYPERGLYCEMIA, WITHOUT LONG-TERM CURRENT USE OF INSULIN (HCC): ICD-10-CM

## 2023-05-08 DIAGNOSIS — E78.5 HYPERLIPIDEMIA ASSOCIATED WITH TYPE 2 DIABETES MELLITUS (HCC): ICD-10-CM

## 2023-05-08 DIAGNOSIS — R93.89 ENDOMETRIAL THICKENING ON ULTRASOUND: ICD-10-CM

## 2023-05-08 DIAGNOSIS — N64.4 BREAST PAIN, LEFT: ICD-10-CM

## 2023-05-08 DIAGNOSIS — I10 ESSENTIAL HYPERTENSION: ICD-10-CM

## 2023-05-08 DIAGNOSIS — N18.31 STAGE 3A CHRONIC KIDNEY DISEASE (HCC): ICD-10-CM

## 2023-05-08 PROBLEM — R39.9 UTI SYMPTOMS: Status: RESOLVED | Noted: 2023-03-16 | Resolved: 2023-05-08

## 2023-05-08 LAB
BILIRUBIN, URINE, POC: NEGATIVE
BLOOD URINE, POC: ABNORMAL
GLUCOSE URINE, POC: ABNORMAL
KETONES, URINE, POC: NEGATIVE
LEUKOCYTE ESTERASE, URINE, POC: NEGATIVE
NITRITE, URINE, POC: NEGATIVE
PH, URINE, POC: 5.5 (ref 4.6–8)
PROTEIN,URINE, POC: ABNORMAL
SPECIFIC GRAVITY, URINE, POC: 1.01 (ref 1–1.03)
URINALYSIS CLARITY, POC: ABNORMAL
URINALYSIS COLOR, POC: ABNORMAL
UROBILINOGEN, POC: ABNORMAL

## 2023-05-08 PROCEDURE — 1123F ACP DISCUSS/DSCN MKR DOCD: CPT

## 2023-05-08 PROCEDURE — 99215 OFFICE O/P EST HI 40 MIN: CPT

## 2023-05-08 PROCEDURE — 81001 URINALYSIS AUTO W/SCOPE: CPT

## 2023-05-08 PROCEDURE — 3046F HEMOGLOBIN A1C LEVEL >9.0%: CPT

## 2023-05-08 PROCEDURE — 3074F SYST BP LT 130 MM HG: CPT

## 2023-05-08 PROCEDURE — 3078F DIAST BP <80 MM HG: CPT

## 2023-05-08 ASSESSMENT — PATIENT HEALTH QUESTIONNAIRE - PHQ9
1. LITTLE INTEREST OR PLEASURE IN DOING THINGS: 0
SUM OF ALL RESPONSES TO PHQ9 QUESTIONS 1 & 2: 0
SUM OF ALL RESPONSES TO PHQ QUESTIONS 1-9: 0
2. FEELING DOWN, DEPRESSED OR HOPELESS: 0
SUM OF ALL RESPONSES TO PHQ QUESTIONS 1-9: 0

## 2023-05-08 NOTE — PROGRESS NOTES
Shikha Cunningham presents today for   Chief Complaint   Patient presents with    Arm Pain     Pt c/o Left arm and hand numbness    Urinary Tract Infection     Burning during urination       Is someone accompanying this pt? Brooks Mcknight    Is the patient using any DME equipment during OV? no    Depression Screening:  PHQ-9 Questionaire 5/8/2023 3/3/2023 2/3/2023   Little interest or pleasure in doing things 0 1 0   Feeling down, depressed, or hopeless 0 0 0   PHQ-9 Total Score 0 1 0        LIOR 7-Anxiety   No flowsheet data found. Learning Assessment:  No question data found. Fall Risk  No flowsheet data found. Travel Screening:    Travel Screening     No screening recorded since 05/07/23 0000       Travel History   Travel since 04/08/23    No documented travel since 04/08/23          Health Maintenance reviewed and discussed and ordered per Provider. Social Determinants of Health     Tobacco Use: Low Risk     Smoking Tobacco Use: Never    Smokeless Tobacco Use: Never    Passive Exposure: Not on file   Alcohol Use: Not on file   Financial Resource Strain: Low Risk     Difficulty of Paying Living Expenses: Not hard at all   Food Insecurity: No Food Insecurity    Worried About [address] [facility] in the Last Year: Never true    [address] in the Last Year: Never true   Transportation Needs: Unknown    Lack of Transportation (Medical): Not on file    Lack of Transportation (Non-Medical):  No   Physical Activity: Not on file   Stress: Not on file   Social Connections: Not on file   Intimate Partner Violence: Not on file   Depression: Not at risk    PHQ-2 Score: 0   Housing Stability: Unknown    Unable to Pay for Housing in the Last Year: Not on file    Number of Places Lived in the Last Year: Not on file    Unstable Housing in the Last Year: No        Health Maintenance Due   Topic Date Due    COVID-19 Vaccine (1) Never done    Pneumococcal 65+ years Vaccine (1 - PCV) Never done    Diabetic foot exam No

## 2023-05-09 ENCOUNTER — HOSPITAL ENCOUNTER (OUTPATIENT)
Facility: HOSPITAL | Age: 68
Setting detail: SPECIMEN
Discharge: HOME | End: 2023-05-12

## 2023-05-09 DIAGNOSIS — R35.0 URINARY FREQUENCY: ICD-10-CM

## 2023-05-09 DIAGNOSIS — R35.0 URINARY FREQUENCY: Primary | ICD-10-CM

## 2023-05-09 PROBLEM — N64.4 BREAST PAIN, LEFT: Status: ACTIVE | Noted: 2023-05-09

## 2023-05-09 PROBLEM — E11.3293 MILD NONPROLIFERATIVE DIABETIC RETINOPATHY OF BOTH EYES ASSOCIATED WITH TYPE 2 DIABETES MELLITUS (HCC): Status: ACTIVE | Noted: 2023-05-09

## 2023-05-09 PROBLEM — E11.39 DIABETIC OCULOPATHY (HCC): Status: ACTIVE | Noted: 2023-05-09

## 2023-05-09 PROBLEM — H35.039 HYPERTENSIVE RETINOPATHY: Status: ACTIVE | Noted: 2023-05-09

## 2023-05-09 PROCEDURE — 87086 URINE CULTURE/COLONY COUNT: CPT

## 2023-05-09 PROCEDURE — 87077 CULTURE AEROBIC IDENTIFY: CPT

## 2023-05-09 PROCEDURE — 87186 SC STD MICRODIL/AGAR DIL: CPT

## 2023-05-10 ENCOUNTER — TELEPHONE (OUTPATIENT)
Facility: CLINIC | Age: 68
End: 2023-05-10

## 2023-05-10 NOTE — TELEPHONE ENCOUNTER
Spoke with daughter Massiel Rivas. Advised her to contact cardiology for follow up patient. Stated pt went to follow up appointment 1 month after her discharge from hospital. The nurse took her blood pressure and then she left. Advised daughter on rationale and to expect calls from gynecology and urology referrals. Advised pt on appointment with pharmacist, for diabetes management. Massiel Rivas stated pt has appointment with nephrology on 6/1. Daughter stated pt gets an upset stomach when she takes iron pills, and takes it after she eats lunch. Advised on s/e of iron and to take with food. Daughter stated pt has had history of problems with urination. Was seen by a doctor about 4 years ago, and started on medication. Was told if medication did not work, pt may need possible surgery. Daughter states pt is taking medication (Ditropan) but is unable to hold her urination, and wears briefs. Advised to follow up with urology for management.

## 2023-05-12 LAB
BACTERIA SPEC CULT: ABNORMAL
BACTERIA SPEC CULT: ABNORMAL
CC UR VC: ABNORMAL
SERVICE CMNT-IMP: ABNORMAL

## 2023-05-15 ENCOUNTER — TELEPHONE (OUTPATIENT)
Facility: CLINIC | Age: 68
End: 2023-05-15

## 2023-05-15 NOTE — TELEPHONE ENCOUNTER
----- Message from ANNIKA England sent at 5/15/2023  8:23 AM EDT -----  Please advise pt urine culture positive for UTI. Start Bactrim BID x3 days. Thank you!

## 2023-05-15 NOTE — TELEPHONE ENCOUNTER
----- Message from ANNIKA Moser sent at 5/15/2023  8:23 AM EDT -----  Please advise pt urine culture positive for UTI. Start Bactrim BID x3 days. Thank you!

## 2023-05-22 DIAGNOSIS — E11.65 TYPE 2 DIABETES MELLITUS WITH HYPERGLYCEMIA, WITHOUT LONG-TERM CURRENT USE OF INSULIN (HCC): ICD-10-CM

## 2023-05-22 NOTE — TELEPHONE ENCOUNTER
PLEASE FORWARD TO PCP WITH MEDICATIONS ATTACHED TO MESSAGE. This patient contacted the office for the following prescriptions to be refilled:    Medication requested :     DrugName: metFORMIN (GLUCOPHAGE) 1000 MG tablet    [facility]: [id number] - [place]     PCP: ANNIKA France  LOV: 5/9/23 (look in previous encounters if not listed)  NOV DMA: 6/5/2023  FUTURE APPT:   Future Appointments   Date Time Provider Yana Oneal   5/24/2023  3:00 PM [facility]   5/31/2023  2:00 PM Talia Vega [facility] IO BS AMB   [facility]   6/15/2023  2:30 PM [facility]         Thank you.

## 2023-05-24 ENCOUNTER — HOSPITAL ENCOUNTER (OUTPATIENT)
Facility: HOSPITAL | Age: 68
Discharge: HOME | End: 2023-05-27
Payer: MEDICARE

## 2023-05-24 DIAGNOSIS — Z12.31 ENCOUNTER FOR SCREENING MAMMOGRAM FOR BREAST CANCER: ICD-10-CM

## 2023-05-24 PROCEDURE — 77063 BREAST TOMOSYNTHESIS BI: CPT

## 2023-05-31 ENCOUNTER — TELEPHONE (OUTPATIENT)
Age: 68
End: 2023-05-31

## 2023-06-08 ENCOUNTER — OFFICE VISIT (OUTPATIENT)
Facility: CLINIC | Age: 68
End: 2023-06-08
Payer: MEDICARE

## 2023-06-08 VITALS
DIASTOLIC BLOOD PRESSURE: 62 MMHG | WEIGHT: 215 LBS | RESPIRATION RATE: 16 BRPM | SYSTOLIC BLOOD PRESSURE: 134 MMHG | HEIGHT: 64 IN | TEMPERATURE: 98 F | OXYGEN SATURATION: 100 % | BODY MASS INDEX: 36.7 KG/M2 | HEART RATE: 66 BPM

## 2023-06-08 DIAGNOSIS — I10 ESSENTIAL HYPERTENSION: ICD-10-CM

## 2023-06-08 DIAGNOSIS — E78.5 HYPERLIPIDEMIA ASSOCIATED WITH TYPE 2 DIABETES MELLITUS (HCC): ICD-10-CM

## 2023-06-08 DIAGNOSIS — Z09 HOSPITAL DISCHARGE FOLLOW-UP: ICD-10-CM

## 2023-06-08 DIAGNOSIS — R06.02 SHORTNESS OF BREATH: ICD-10-CM

## 2023-06-08 DIAGNOSIS — E11.69 HYPERLIPIDEMIA ASSOCIATED WITH TYPE 2 DIABETES MELLITUS (HCC): ICD-10-CM

## 2023-06-08 DIAGNOSIS — A41.9 SEPSIS, DUE TO UNSPECIFIED ORGANISM, UNSPECIFIED WHETHER ACUTE ORGAN DYSFUNCTION PRESENT (HCC): Primary | ICD-10-CM

## 2023-06-08 PROCEDURE — 99215 OFFICE O/P EST HI 40 MIN: CPT

## 2023-06-08 PROCEDURE — 1036F TOBACCO NON-USER: CPT

## 2023-06-08 PROCEDURE — 1090F PRES/ABSN URINE INCON ASSESS: CPT

## 2023-06-08 PROCEDURE — G8400 PT W/DXA NO RESULTS DOC: HCPCS

## 2023-06-08 PROCEDURE — 1123F ACP DISCUSS/DSCN MKR DOCD: CPT

## 2023-06-08 PROCEDURE — 3078F DIAST BP <80 MM HG: CPT

## 2023-06-08 PROCEDURE — G8427 DOCREV CUR MEDS BY ELIG CLIN: HCPCS

## 2023-06-08 PROCEDURE — 3074F SYST BP LT 130 MM HG: CPT

## 2023-06-08 PROCEDURE — 3046F HEMOGLOBIN A1C LEVEL >9.0%: CPT

## 2023-06-08 PROCEDURE — 2022F DILAT RTA XM EVC RTNOPTHY: CPT

## 2023-06-08 PROCEDURE — G8417 CALC BMI ABV UP PARAM F/U: HCPCS

## 2023-06-08 PROCEDURE — 1111F DSCHRG MED/CURRENT MED MERGE: CPT

## 2023-06-08 PROCEDURE — 3017F COLORECTAL CA SCREEN DOC REV: CPT

## 2023-06-08 RX ORDER — HYDROCODONE POLISTIREX AND CHLORPHENIRAMINE POLISTIREX 10; 8 MG/5ML; MG/5ML
SUSPENSION, EXTENDED RELEASE ORAL
COMMUNITY
Start: 2023-06-03

## 2023-06-08 RX ORDER — CARVEDILOL 25 MG/1
25 TABLET ORAL 2 TIMES DAILY
Qty: 180 TABLET | Refills: 1 | Status: SHIPPED | OUTPATIENT
Start: 2023-06-08

## 2023-06-08 RX ORDER — FERROUS SULFATE 325(65) MG
1 TABLET ORAL
COMMUNITY
Start: 2023-05-30 | End: 2023-06-08 | Stop reason: CLARIF

## 2023-06-08 RX ORDER — NEBULIZER ACCESSORIES
1 KIT MISCELLANEOUS DAILY PRN
Qty: 1 KIT | Refills: 0 | Status: SHIPPED | OUTPATIENT
Start: 2023-06-08

## 2023-06-08 RX ORDER — ALBUTEROL SULFATE 90 UG/1
2 AEROSOL, METERED RESPIRATORY (INHALATION) EVERY 4 HOURS PRN
Qty: 1 EACH | Refills: 1 | Status: SHIPPED | OUTPATIENT
Start: 2023-06-08

## 2023-06-08 ASSESSMENT — PATIENT HEALTH QUESTIONNAIRE - PHQ9
SUM OF ALL RESPONSES TO PHQ QUESTIONS 1-9: 0
2. FEELING DOWN, DEPRESSED OR HOPELESS: 0
SUM OF ALL RESPONSES TO PHQ9 QUESTIONS 1 & 2: 0
1. LITTLE INTEREST OR PLEASURE IN DOING THINGS: 0
SUM OF ALL RESPONSES TO PHQ QUESTIONS 1-9: 0

## 2023-06-08 ASSESSMENT — ENCOUNTER SYMPTOMS: SHORTNESS OF BREATH: 1

## 2023-06-08 NOTE — PROGRESS NOTES
Transitional Care Management Progress Note    Patient: Ada Amado  : 1955  PCP: ANNIKA Haile    Date of admission: 23  Date of discharge: 6/3/23    Patient was contacted by Transitional Care Management services within two days after her discharge: no. This encounter and supporting documentation was reviewed if available. Medication reconciliation was performed today (2023). Assessment/Plan:     1. Sepsis, due to unspecified organism, unspecified whether acute organ dysfunction present [facility])  Comments:  Resolved  2. Shortness of breath  Assessment & Plan:  Improving  Continue current regimen  Repeat chest xray in 4 weeks  Orders:  -     CBC with Auto Differential; Future  -     XR CHEST (2 VIEWS); Future  -     Respiratory Therapy Supplies (NEBULIZER/TUBING/MOUTHPIECE) KIT; DAILY PRN Starting Thu 2023, Disp-1 kit, R-0, Normal  -     albuterol sulfate HFA (VENTOLIN HFA) 108 (90 Base) MCG/ACT inhaler; Inhale 2 puffs into the lungs every 4 hours as needed for Wheezing or Shortness of Breath, Disp-1 each, R-1Normal  3. Essential hypertension  Assessment & Plan:  BP acceptable, goal <130/80, continue regimen  Orders:  -     carvedilol (COREG) 25 MG tablet; Take 1 tablet by mouth 2 times daily, Disp-180 tablet, R-1Normal  4. Hyperlipidemia associated with type 2 diabetes mellitus ([facility] [address]  -     Comprehensive Metabolic Panel; Future  -     Lipid Panel; Future  5. Hospital discharge follow-up  -     ND DISCHARGE MEDS RECONCILED W/ CURRENT OUTPATIENT MED LIST    Follow-up and Dispositions    Return in about 6 weeks (around 2023) for Chest pain, diabetes, cholesterol, lab results. Subjective: Ada Amado is a 79 y.o. female presenting today for follow-up after being discharged from [facility].  The discharge summary was reviewed or requested. The main problem requiring admission was Chest pain, SOB.        Hospital Course Per Discharge Summary:    HPI:
exam  Never done    DTaP/Tdap/Td vaccine (1 - Tdap) Never done    Colorectal Cancer Screen  Never done    Shingles vaccine (1 of 2) Never done    DEXA (modify frequency per FRAX score)  Never done    Annual Wellness Visit (AWV)  05/14/2023    A1C test (Diabetic or Prediabetic)  06/03/2023   . Coordination of Care:  1. Have you been to the ER, urgent care clinic since your last visit? Hospitalized since your last visit? Yes    2. Have you seen or consulted any other health care providers outside of the [address] [place] [address] since your last visit? Include any pap smears or colon screening.  no

## 2023-06-08 NOTE — PATIENT INSTRUCTIONS
[facility]  - Call to make appt  [id number] [facility], [address]   [place], [address]   [phone]

## 2023-06-19 ENCOUNTER — PHARMACY VISIT (OUTPATIENT)
Age: 68
End: 2023-06-19

## 2023-06-19 DIAGNOSIS — E11.65 TYPE 2 DIABETES MELLITUS WITH HYPERGLYCEMIA, WITH LONG-TERM CURRENT USE OF INSULIN (HCC): Primary | ICD-10-CM

## 2023-06-19 DIAGNOSIS — Z79.4 TYPE 2 DIABETES MELLITUS WITH HYPERGLYCEMIA, WITH LONG-TERM CURRENT USE OF INSULIN (HCC): Primary | ICD-10-CM

## 2023-06-19 RX ORDER — SEMAGLUTIDE 0.68 MG/ML
INJECTION, SOLUTION SUBCUTANEOUS
Qty: 3 ML | Refills: 1 | Status: SHIPPED | OUTPATIENT
Start: 2023-06-19 | End: 2023-07-31

## 2023-06-19 RX ORDER — ICOSAPENT ETHYL 1000 MG/1
1 CAPSULE ORAL DAILY
COMMUNITY

## 2023-06-19 RX ORDER — INSULIN GLARGINE 100 [IU]/ML
32 INJECTION, SOLUTION SUBCUTANEOUS NIGHTLY
Qty: 5 ADJUSTABLE DOSE PRE-FILLED PEN SYRINGE | Refills: 0 | Status: SHIPPED
Start: 2023-06-19

## 2023-06-19 RX ORDER — BENZONATATE 100 MG/1
100 CAPSULE ORAL 2 TIMES DAILY PRN
COMMUNITY

## 2023-06-19 NOTE — PATIENT INSTRUCTIONS
Your Visit Summary:     Plan:  - Stop Metformin    - Until starting Ozempic: Continue everything as directed except for stopping Metformin    - Once starting Ozempic 0.25mg every week, decrease Basaglar to 32 units every morning and stop the glimepiride      Call me with any questions or concerns  Trent Walker [phone]    Check and document your blood sugar first thing in the morning (fasting at least 8 hours), 2 hours after a meal, and/or before bedtime. Bring your meter/log to all future visits. Your blood sugar goals:  - Fasting (first thing in the morning)  blood sugar: 80 - 130mg/dL  - 2 hours after a meal: 80 - 180mg/dL    When you experience symptoms of low blood sugar (example: less than 70):  - Confirm low reading by checking your blood sugar.   - Then treat with 15 grams of carbohydrates (one-half cup of juice or regular soda, or 4-5 glucose tablets). - Wait 15 minutes to recheck blood sugar.   - Then eat a protein containing meal/snack to prevent another low blood sugar episode. (example: peanut butter + crackers)    Nutrition:  - When reviewing a nutrition label, focus on the serving size, total calories, fat (and type of fats), total carbohydrates, sugar (and amount of added sugar), amount of fiber (good for your digestive), and amount of protein. Refer to your nutrition label guide for more information.  - For a meal : max 45 - 60 grams of carbohydrates  - For a snack: max 15 grams of carbohydrates  - Reduce amount of saturated and trans fat. Consider more unsaturated fat options as they are better for your heart health.    - Have at least 1 serving of lean fat protein with each meal.    - Increase fiber intake slowly to prevent constipation.   - Substitute fruit juices for the whole fruit    Low carb snack ideas (15 grams total carb or less):    String cheese or babybel with 6 crackers  4 peanut butter crackers  3 cups of popcorn  1 cup raw vegetables with hummus or ranch dip (just need

## 2023-06-20 ENCOUNTER — HOSPITAL ENCOUNTER (OUTPATIENT)
Facility: HOSPITAL | Age: 68
Discharge: HOME | End: 2023-06-23
Payer: MEDICARE

## 2023-06-20 DIAGNOSIS — E11.69 HYPERLIPIDEMIA ASSOCIATED WITH TYPE 2 DIABETES MELLITUS (HCC): ICD-10-CM

## 2023-06-20 DIAGNOSIS — E78.5 HYPERLIPIDEMIA ASSOCIATED WITH TYPE 2 DIABETES MELLITUS (HCC): ICD-10-CM

## 2023-06-20 DIAGNOSIS — R06.02 SHORTNESS OF BREATH: ICD-10-CM

## 2023-06-20 LAB
ALBUMIN SERPL-MCNC: 3.3 G/DL (ref 3.4–5)
ALBUMIN/GLOB SERPL: 0.9 (ref 0.8–1.7)
ALP SERPL-CCNC: 50 U/L (ref 45–117)
ALT SERPL-CCNC: 28 U/L (ref 13–56)
ANION GAP SERPL CALC-SCNC: 3 MMOL/L (ref 3–18)
AST SERPL-CCNC: 18 U/L (ref 10–38)
BASOPHILS # BLD: 0 K/UL (ref 0–0.1)
BASOPHILS NFR BLD: 1 % (ref 0–2)
BILIRUB SERPL-MCNC: 0.3 MG/DL (ref 0.2–1)
BUN SERPL-MCNC: 39 MG/DL (ref 7–18)
BUN/CREAT SERPL: 19 (ref 12–20)
CALCIUM SERPL-MCNC: 9.5 MG/DL (ref 8.5–10.1)
CHLORIDE SERPL-SCNC: 99 MMOL/L (ref 100–111)
CHOLEST SERPL-MCNC: 112 MG/DL
CO2 SERPL-SCNC: 33 MMOL/L (ref 21–32)
CREAT SERPL-MCNC: 2.06 MG/DL (ref 0.6–1.3)
DIFFERENTIAL METHOD BLD: ABNORMAL
EOSINOPHIL # BLD: 0.4 K/UL (ref 0–0.4)
EOSINOPHIL NFR BLD: 10 % (ref 0–5)
ERYTHROCYTE [DISTWIDTH] IN BLOOD BY AUTOMATED COUNT: 12.5 % (ref 11.6–14.5)
GLOBULIN SER CALC-MCNC: 3.7 G/DL (ref 2–4)
GLUCOSE SERPL-MCNC: 149 MG/DL (ref 74–99)
HCT VFR BLD AUTO: 32.3 % (ref 35–45)
HDLC SERPL-MCNC: 40 MG/DL (ref 40–60)
HDLC SERPL: 2.8 (ref 0–5)
HGB BLD-MCNC: 10.1 G/DL (ref 12–16)
IMM GRANULOCYTES # BLD AUTO: 0 K/UL (ref 0–0.04)
IMM GRANULOCYTES NFR BLD AUTO: 0 % (ref 0–0.5)
LDLC SERPL CALC-MCNC: 47.8 MG/DL (ref 0–100)
LIPID PANEL: NORMAL
LYMPHOCYTES # BLD: 1.6 K/UL (ref 0.9–3.6)
LYMPHOCYTES NFR BLD: 43 % (ref 21–52)
MCH RBC QN AUTO: 24.5 PG (ref 24–34)
MCHC RBC AUTO-ENTMCNC: 31.3 G/DL (ref 31–37)
MCV RBC AUTO: 78.2 FL (ref 78–100)
MONOCYTES # BLD: 0.5 K/UL (ref 0.05–1.2)
MONOCYTES NFR BLD: 13 % (ref 3–10)
NEUTS SEG # BLD: 1.3 K/UL (ref 1.8–8)
NEUTS SEG NFR BLD: 34 % (ref 40–73)
NRBC # BLD: 0 K/UL (ref 0–0.01)
NRBC BLD-RTO: 0 PER 100 WBC
PLATELET # BLD AUTO: 248 K/UL (ref 135–420)
PMV BLD AUTO: 11.7 FL (ref 9.2–11.8)
POTASSIUM SERPL-SCNC: 4.9 MMOL/L (ref 3.5–5.5)
PROT SERPL-MCNC: 7 G/DL (ref 6.4–8.2)
RBC # BLD AUTO: 4.13 M/UL (ref 4.2–5.3)
SODIUM SERPL-SCNC: 135 MMOL/L (ref 136–145)
TRIGL SERPL-MCNC: 121 MG/DL
VLDLC SERPL CALC-MCNC: 24.2 MG/DL
WBC # BLD AUTO: 3.8 K/UL (ref 4.6–13.2)

## 2023-06-20 PROCEDURE — 36415 COLL VENOUS BLD VENIPUNCTURE: CPT

## 2023-06-20 PROCEDURE — 80061 LIPID PANEL: CPT

## 2023-06-20 PROCEDURE — 80053 COMPREHEN METABOLIC PANEL: CPT

## 2023-06-20 PROCEDURE — 85025 COMPLETE CBC W/AUTO DIFF WBC: CPT

## 2023-06-27 RX ORDER — LANOLIN ALCOHOL/MO/W.PET/CERES
1000 CREAM (GRAM) TOPICAL DAILY
Qty: 90 TABLET | Refills: 0 | Status: SHIPPED | OUTPATIENT
Start: 2023-06-27

## 2023-06-27 RX ORDER — OXYBUTYNIN CHLORIDE 5 MG/1
TABLET ORAL
Qty: 90 TABLET | Refills: 0 | Status: SHIPPED | OUTPATIENT
Start: 2023-06-27

## 2023-06-30 ENCOUNTER — HOSPITAL ENCOUNTER (OUTPATIENT)
Facility: HOSPITAL | Age: 68
End: 2023-06-30
Payer: MEDICARE

## 2023-06-30 LAB
25(OH)D3 SERPL-MCNC: 45.9 NG/ML (ref 30–100)
ALBUMIN SERPL-MCNC: 3.2 G/DL (ref 3.4–5)
ANION GAP SERPL CALC-SCNC: 6 MMOL/L (ref 3–18)
APPEARANCE UR: CLEAR
BACTERIA URNS QL MICRO: NEGATIVE /HPF
BILIRUB UR QL: NEGATIVE
BUN SERPL-MCNC: 55 MG/DL (ref 7–18)
BUN/CREAT SERPL: 31 (ref 12–20)
CALCIUM SERPL-MCNC: 8.8 MG/DL (ref 8.5–10.1)
CALCIUM SERPL-MCNC: 9.3 MG/DL (ref 8.5–10.1)
CHLORIDE SERPL-SCNC: 101 MMOL/L (ref 100–111)
CO2 SERPL-SCNC: 30 MMOL/L (ref 21–32)
COLOR UR: YELLOW
CREAT SERPL-MCNC: 1.78 MG/DL (ref 0.6–1.3)
CREAT UR-MCNC: 94 MG/DL (ref 30–125)
EPITH CASTS URNS QL MICRO: NORMAL /LPF (ref 0–5)
ERYTHROCYTE [DISTWIDTH] IN BLOOD BY AUTOMATED COUNT: 12.3 % (ref 11.6–14.5)
GLUCOSE SERPL-MCNC: 277 MG/DL (ref 74–99)
GLUCOSE UR STRIP.AUTO-MCNC: NEGATIVE MG/DL
HCT VFR BLD AUTO: 31.9 % (ref 35–45)
HGB BLD-MCNC: 10.2 G/DL (ref 12–16)
HGB UR QL STRIP: NEGATIVE
KETONES UR QL STRIP.AUTO: NEGATIVE MG/DL
LEUKOCYTE ESTERASE UR QL STRIP.AUTO: ABNORMAL
MCH RBC QN AUTO: 25.4 PG (ref 24–34)
MCHC RBC AUTO-ENTMCNC: 32 G/DL (ref 31–37)
MCV RBC AUTO: 79.4 FL (ref 78–100)
MICROALBUMIN UR-MCNC: 84.9 MG/DL (ref 0–3)
MICROALBUMIN/CREAT UR-RTO: 903 MG/G (ref 0–30)
NITRITE UR QL STRIP.AUTO: NEGATIVE
NRBC # BLD: 0 K/UL (ref 0–0.01)
NRBC BLD-RTO: 0 PER 100 WBC
PH UR STRIP: 5 (ref 5–8)
PHOSPHATE SERPL-MCNC: 4.7 MG/DL (ref 2.5–4.9)
PLATELET # BLD AUTO: 247 K/UL (ref 135–420)
PMV BLD AUTO: 11.3 FL (ref 9.2–11.8)
POTASSIUM SERPL-SCNC: 4.3 MMOL/L (ref 3.5–5.5)
PROT UR STRIP-MCNC: 100 MG/DL
PTH-INTACT SERPL-MCNC: 223 PG/ML (ref 18.4–88)
RBC # BLD AUTO: 4.02 M/UL (ref 4.2–5.3)
RBC #/AREA URNS HPF: NORMAL /HPF (ref 0–5)
SODIUM SERPL-SCNC: 137 MMOL/L (ref 136–145)
SP GR UR REFRACTOMETRY: 1.01 (ref 1–1.03)
UROBILINOGEN UR QL STRIP.AUTO: 0.2 EU/DL (ref 0.2–1)
WBC # BLD AUTO: 5 K/UL (ref 4.6–13.2)
WBC URNS QL MICRO: NORMAL /HPF (ref 0–4)

## 2023-06-30 PROCEDURE — 82306 VITAMIN D 25 HYDROXY: CPT

## 2023-06-30 PROCEDURE — 36415 COLL VENOUS BLD VENIPUNCTURE: CPT

## 2023-06-30 PROCEDURE — 85027 COMPLETE CBC AUTOMATED: CPT

## 2023-06-30 PROCEDURE — 80069 RENAL FUNCTION PANEL: CPT

## 2023-06-30 PROCEDURE — 81001 URINALYSIS AUTO W/SCOPE: CPT

## 2023-06-30 PROCEDURE — 83970 ASSAY OF PARATHORMONE: CPT

## 2023-06-30 PROCEDURE — 82043 UR ALBUMIN QUANTITATIVE: CPT

## 2023-06-30 PROCEDURE — 82570 ASSAY OF URINE CREATININE: CPT

## 2023-06-30 RX ORDER — ROSUVASTATIN CALCIUM 20 MG/1
20 TABLET, COATED ORAL DAILY
Qty: 90 TABLET | Refills: 1 | Status: SHIPPED | OUTPATIENT
Start: 2023-06-30

## 2023-06-30 RX ORDER — FUROSEMIDE 40 MG/1
40 TABLET ORAL 2 TIMES DAILY
Qty: 180 TABLET | Refills: 1 | OUTPATIENT
Start: 2023-06-30

## 2023-06-30 NOTE — TELEPHONE ENCOUNTER
Patient called and needs a medication refill on   furosemide (LASIX) 40 MG tablet  rosuvastatin (CRESTOR) 20 MG tablet  Patient is asking for a month supply because [place]. Her callback number is [phone]. Please advise.

## 2023-07-05 ENCOUNTER — PHARMACY VISIT (OUTPATIENT)
Age: 68
End: 2023-07-05

## 2023-07-05 DIAGNOSIS — Z79.4 TYPE 2 DIABETES MELLITUS WITH HYPERGLYCEMIA, WITH LONG-TERM CURRENT USE OF INSULIN (HCC): Primary | ICD-10-CM

## 2023-07-05 DIAGNOSIS — E11.65 TYPE 2 DIABETES MELLITUS WITH HYPERGLYCEMIA, WITH LONG-TERM CURRENT USE OF INSULIN (HCC): Primary | ICD-10-CM

## 2023-07-05 RX ORDER — INSULIN GLARGINE 100 [IU]/ML
40 INJECTION, SOLUTION SUBCUTANEOUS EVERY MORNING
Qty: 15 ML | Refills: 11 | Status: SHIPPED | OUTPATIENT
Start: 2023-07-05

## 2023-07-05 RX ORDER — CALCIUM CITRATE/VITAMIN D3 200MG-6.25
TABLET ORAL
Qty: 200 EACH | Refills: 3 | Status: SHIPPED | OUTPATIENT
Start: 2023-07-05

## 2023-07-05 RX ORDER — SEMAGLUTIDE 0.68 MG/ML
0.5 INJECTION, SOLUTION SUBCUTANEOUS
Qty: 3 ML | Refills: 0 | Status: SHIPPED | OUTPATIENT
Start: 2023-07-05 | End: 2023-08-02

## 2023-07-05 NOTE — PROGRESS NOTES
Pharmacy Progress Note - Diabetes Management       Assessment / Plan:   Diabetes Management:  Per ADA guidelines, Pt's A1c is not at goal of < 7%. Unable to truly assess her glycemic control d/t the variability of her FBG and pc breakfast values. She is also not checking any other time of the day. She alters her eating habits based on her BG value which makes assessing her control difficult. She has hypoglycemia s/sx in the 90s which prompts her to eat more carbs which in turn spikes her BG values. As the Ozempic is titrated, hopefully her prandial control will improve. With her leaving the country for 1[place]2 months, will not alter her current Basaglar 40 units qam dose. She will continue to titrate at 0.25mg x3 more weeks and then increase to 0.5mg weekly until follow up to be schedule when she knows her return date. Nutrition/Lifestyle Modifications:  - Educated pt on the importance of moderating carbohydrate intake. Reviewed sources of carbohydrates and method to help determine appropriate portion sizes (e.g., Diabetes Plate Method). - Advised patient to avoid sugar-sweetened beverages and replace with water or diet/zero sugar option.  - Recommend ~30 minutes consistent, moderately intensive, exercise/day or ~150 minutes/week. Start small, stay consistent, and increase length and types of exercise, as tolerated. Patient will return to clinic in 1-2 months for follow up. S/O: Ms. Ok Brown, a 79 y.o. female referred by ANNIKA Hernandez,  has a past medical history of Diabetes ([address]) and Hypertension. Pt was seen today for diabetes management. Patient's last A1c was:   Hemoglobin A1C   Date Value Ref Range Status   03/03/2023 9.8 (H) 4.2 - 5.6 % Final     Comment:     (NOTE)  HbA1C Interpretive Ranges  <5.7              Normal  5.7 - 6.4         Consider Prediabetes  >6.5              Consider Diabetes         Interim update: Pt was last seen by me on 6/19/2023.   Per my prior

## 2023-07-07 ASSESSMENT — ENCOUNTER SYMPTOMS: SHORTNESS OF BREATH: 0

## 2023-07-10 ENCOUNTER — OFFICE VISIT (OUTPATIENT)
Facility: CLINIC | Age: 68
End: 2023-07-10
Payer: COMMERCIAL

## 2023-07-10 VITALS
HEIGHT: 60 IN | RESPIRATION RATE: 16 BRPM | SYSTOLIC BLOOD PRESSURE: 142 MMHG | HEART RATE: 77 BPM | TEMPERATURE: 97.8 F | DIASTOLIC BLOOD PRESSURE: 78 MMHG | BODY MASS INDEX: 42.01 KG/M2 | OXYGEN SATURATION: 100 % | WEIGHT: 214 LBS

## 2023-07-10 DIAGNOSIS — I10 ESSENTIAL HYPERTENSION: ICD-10-CM

## 2023-07-10 DIAGNOSIS — E11.65 TYPE 2 DIABETES MELLITUS WITH HYPERGLYCEMIA, WITHOUT LONG-TERM CURRENT USE OF INSULIN (HCC): Primary | ICD-10-CM

## 2023-07-10 DIAGNOSIS — R06.02 SHORTNESS OF BREATH: ICD-10-CM

## 2023-07-10 DIAGNOSIS — D64.9 ANEMIA, UNSPECIFIED TYPE: ICD-10-CM

## 2023-07-10 DIAGNOSIS — R42 DIZZINESS: ICD-10-CM

## 2023-07-10 PROCEDURE — 3074F SYST BP LT 130 MM HG: CPT

## 2023-07-10 PROCEDURE — 99215 OFFICE O/P EST HI 40 MIN: CPT

## 2023-07-10 PROCEDURE — 3078F DIAST BP <80 MM HG: CPT

## 2023-07-10 PROCEDURE — 3046F HEMOGLOBIN A1C LEVEL >9.0%: CPT

## 2023-07-10 PROCEDURE — 1123F ACP DISCUSS/DSCN MKR DOCD: CPT

## 2023-07-10 RX ORDER — HYDRALAZINE HYDROCHLORIDE 100 MG/1
100 TABLET, FILM COATED ORAL 2 TIMES DAILY
COMMUNITY
Start: 2023-07-07

## 2023-07-10 RX ORDER — BUMETANIDE 1 MG/1
1 TABLET ORAL 2 TIMES DAILY
COMMUNITY
Start: 2023-07-07

## 2023-07-10 RX ORDER — ALBUTEROL SULFATE 90 UG/1
2 AEROSOL, METERED RESPIRATORY (INHALATION) EVERY 4 HOURS PRN
Qty: 1 EACH | Refills: 1 | Status: SHIPPED | OUTPATIENT
Start: 2023-07-10

## 2023-07-10 RX ORDER — LANOLIN ALCOHOL/MO/W.PET/CERES
325 CREAM (GRAM) TOPICAL
Qty: 90 TABLET | Refills: 1 | Status: SHIPPED | OUTPATIENT
Start: 2023-07-10

## 2023-07-10 ASSESSMENT — PATIENT HEALTH QUESTIONNAIRE - PHQ9
SUM OF ALL RESPONSES TO PHQ QUESTIONS 1-9: 0
2. FEELING DOWN, DEPRESSED OR HOPELESS: 0
SUM OF ALL RESPONSES TO PHQ9 QUESTIONS 1 & 2: 0
1. LITTLE INTEREST OR PLEASURE IN DOING THINGS: 0

## 2023-07-10 NOTE — PROGRESS NOTES
Arcelia Delgado presents today for   Chief Complaint   Patient presents with    Cholesterol Problem    Diabetes    Chest Pain    Follow-Up from Hospital     Pt was in ER        Is someone accompanying this pt? son    Is the patient using any DME equipment during OV? no    Depression Screening:  PHQ-9 Questionaire 7/10/2023 6/8/2023 5/8/2023 3/3/2023 2/3/2023   Little interest or pleasure in doing things 0 0 0 1 0   Feeling down, depressed, or hopeless 0 0 0 0 0   PHQ-9 Total Score 0 0 0 1 0        LIOR 7-Anxiety   No flowsheet data found. Learning Assessment:  No question data found. Fall Risk  No flowsheet data found. Travel Screening:    Travel Screening     No screening recorded since 07/09/23 0000       Travel History   Travel since 06/10/23    No documented travel since 06/10/23          Health Maintenance reviewed and discussed and ordered per Provider. Social Determinants of Health     Tobacco Use: Low Risk     Smoking Tobacco Use: Never    Smokeless Tobacco Use: Never    Passive Exposure: Not on file   Alcohol Use: Not on file   Financial Resource Strain: Low Risk     Difficulty of Paying Living Expenses: Not hard at all   Food Insecurity: No Food Insecurity    Worried About [place] in the Last Year: Never true    [address] in the Last Year: Never true   Transportation Needs: Unknown    Lack of Transportation (Medical): Not on file    Lack of Transportation (Non-Medical):  No   Physical Activity: Not on file   Stress: Not on file   Social Connections: Not on file   Intimate Partner Violence: Not on file   Depression: Not at risk    PHQ-2 Score: 0   Housing Stability: Unknown    Unable to Pay for Housing in the Last Year: Not on file    Number of Places Lived in the Last Year: Not on file    Unstable Housing in the Last Year: No        Health Maintenance Due   Topic Date Due    COVID-19 Vaccine (1) Never done    Pneumococcal 65+ years Vaccine (1 - PCV) Never done    Diabetic

## 2023-07-11 ENCOUNTER — TELEPHONE (OUTPATIENT)
Facility: CLINIC | Age: 68
End: 2023-07-11

## 2023-07-11 ENCOUNTER — TELEPHONE (OUTPATIENT)
Age: 68
End: 2023-07-11

## 2023-07-11 PROBLEM — R42 DIZZINESS: Status: ACTIVE | Noted: 2023-07-11

## 2023-07-11 PROBLEM — I25.2 HISTORY OF MI (MYOCARDIAL INFARCTION): Status: ACTIVE | Noted: 2022-12-15

## 2023-07-11 NOTE — ASSESSMENT & PLAN NOTE
Orthostatic BP negative  History of vertigo and inner ear dysfunction  Referral to ENT for management

## 2023-08-26 DIAGNOSIS — I10 ESSENTIAL HYPERTENSION: ICD-10-CM

## 2023-08-28 RX ORDER — AMLODIPINE BESYLATE 5 MG/1
TABLET ORAL
Qty: 90 TABLET | Refills: 1 | OUTPATIENT
Start: 2023-08-28

## 2023-09-07 RX ORDER — SEMAGLUTIDE 0.68 MG/ML
0.5 INJECTION, SOLUTION SUBCUTANEOUS
Qty: 3 ML | Refills: 1 | Status: SHIPPED | OUTPATIENT
Start: 2023-09-07 | End: 2023-10-05

## 2023-09-19 ENCOUNTER — TELEPHONE (OUTPATIENT)
Age: 68
End: 2023-09-19

## 2023-09-19 NOTE — TELEPHONE ENCOUNTER
Pharmacy Progress Note - Telephone Call    Ms. Ronaldo Baumaning 79 y.o. was contacted via an outbound telephone call today to reschedule their missed appointment for PharmD diabetes management and education per referral from MERRILL AntonyC. Please call pt to reschedule. Please close encounter after scheduled. If unable to reach pt after 3 documented attempts, please close encounter.     Thank you,    Roslyn Gonzales, PharmD, BCACP, BC-ADM

## 2023-09-25 ASSESSMENT — ENCOUNTER SYMPTOMS: SHORTNESS OF BREATH: 0

## 2023-09-25 NOTE — ASSESSMENT & PLAN NOTE
Continue current regimen  Appreciate pharmacy's assistance with management   Advised patient, daughter and daughter-in-law to schedule f/u appointment with pharmacy for management

## 2023-10-02 ENCOUNTER — TELEPHONE (OUTPATIENT)
Facility: CLINIC | Age: 68
End: 2023-10-02

## 2023-10-02 ENCOUNTER — OFFICE VISIT (OUTPATIENT)
Facility: CLINIC | Age: 68
End: 2023-10-02
Payer: COMMERCIAL

## 2023-10-02 VITALS
RESPIRATION RATE: 17 BRPM | TEMPERATURE: 97.3 F | OXYGEN SATURATION: 100 % | SYSTOLIC BLOOD PRESSURE: 151 MMHG | HEIGHT: 60 IN | DIASTOLIC BLOOD PRESSURE: 65 MMHG | BODY MASS INDEX: 41.9 KG/M2 | HEART RATE: 77 BPM | WEIGHT: 213.4 LBS

## 2023-10-02 DIAGNOSIS — I10 ESSENTIAL HYPERTENSION: ICD-10-CM

## 2023-10-02 DIAGNOSIS — E53.8 VITAMIN B12 DEFICIENCY: ICD-10-CM
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
## 2023-10-02 DIAGNOSIS — R35.0 URINARY FREQUENCY: ICD-10-CM

## 2023-10-02 DIAGNOSIS — M17.0 BILATERAL PRIMARY OSTEOARTHRITIS OF KNEE: ICD-10-CM

## 2023-10-02 DIAGNOSIS — Z23 NEEDS FLU SHOT: ICD-10-CM

## 2023-10-02 DIAGNOSIS — R07.9 CHEST PAIN, UNSPECIFIED TYPE: ICD-10-CM

## 2023-10-02 DIAGNOSIS — E11.65 TYPE 2 DIABETES MELLITUS WITH HYPERGLYCEMIA, WITHOUT LONG-TERM CURRENT USE OF INSULIN (HCC): ICD-10-CM

## 2023-10-02 DIAGNOSIS — R42 DIZZINESS: ICD-10-CM

## 2023-10-02 PROBLEM — R06.02 SHORTNESS OF BREATH: Status: RESOLVED | Noted: 2023-02-03 | Resolved: 2023-10-02

## 2023-10-02 PROCEDURE — 3078F DIAST BP <80 MM HG: CPT

## 2023-10-02 PROCEDURE — 90471 IMMUNIZATION ADMIN: CPT

## 2023-10-02 PROCEDURE — 99215 OFFICE O/P EST HI 40 MIN: CPT

## 2023-10-02 PROCEDURE — 3046F HEMOGLOBIN A1C LEVEL >9.0%: CPT

## 2023-10-02 PROCEDURE — 1123F ACP DISCUSS/DSCN MKR DOCD: CPT

## 2023-10-02 PROCEDURE — 90694 VACC AIIV4 NO PRSRV 0.5ML IM: CPT

## 2023-10-02 PROCEDURE — 3077F SYST BP >= 140 MM HG: CPT

## 2023-10-02 RX ORDER — INSULIN GLARGINE 100 [IU]/ML
40 INJECTION, SOLUTION SUBCUTANEOUS EVERY MORNING
Qty: 15 ML | Refills: 11 | Status: SHIPPED | OUTPATIENT
Start: 2023-10-02 | End: 2023-10-02

## 2023-10-02 RX ORDER — LISINOPRIL 40 MG/1
40 TABLET ORAL DAILY
COMMUNITY

## 2023-10-02 RX ORDER — ASPIRIN 81 MG/1
81 TABLET ORAL DAILY
Qty: 90 TABLET | Refills: 1 | Status: SHIPPED | OUTPATIENT
Start: 2023-10-02

## 2023-10-02 RX ORDER — INSULIN GLARGINE 100 [IU]/ML
32 INJECTION, SOLUTION SUBCUTANEOUS EVERY MORNING
Qty: 15 ML | Refills: 3 | Status: SHIPPED | OUTPATIENT
Start: 2023-10-02

## 2023-10-02 RX ORDER — FUROSEMIDE 40 MG/1
TABLET ORAL
COMMUNITY
Start: 2023-09-15 | End: 2023-10-02

## 2023-10-02 NOTE — PROGRESS NOTES
Olya Chambers presents today for   Chief Complaint   Patient presents with    Diabetes    Hypertension     Pt monitoring bp at home systolic 627 does not remember diastolic pressure    Shortness of Breath    Dizziness     Pt states she is still having dizziness, pt sates she had a fall 1 week ago. States dizziness is getting worse. Given phone number to ENT    Menopause    Chest Pain     Pt c/o having chest pain when walking up the stairs. Onset 1 week. Located all over chest pain. Pain is occasional and feels like pressure. Pain last up to 10 minutes. Worsens with standing and walking for long periods. Relieves with rest. Has seen cardiology. Is someone accompanying this pt? Reino Scales    Is the patient using any DME equipment during OV? no    Depression Screenin/10/2023     3:19 PM 2023    11:20 AM 2023     4:17 PM 3/3/2023    11:35 AM 2/3/2023     3:39 PM   PHQ-9 Questionaire   Little interest or pleasure in doing things 0 0 0 1 0   Feeling down, depressed, or hopeless 0 0 0 0 0   PHQ-9 Total Score 0 0 0 1 0        LIOR 7-Anxiety        No data to display                 Learning Assessment:  No question data found. Fall Risk       No data to display                   Travel Screening:    Travel Screening     No screening recorded since 10/01/23 0000       Travel History   Travel since 23    No documented travel since 23          Health Maintenance reviewed and discussed and ordered per Provider.   Social Determinants of Health     Tobacco Use: Low Risk  (2023)    Patient History     Smoking Tobacco Use: Never     Smokeless Tobacco Use: Never     Passive Exposure: Not on file   Alcohol Use: Not on file   Financial Resource Strain: Low Risk  (3/3/2023)    Overall Financial Resource Strain (CARDIA)     Difficulty of Paying Living Expenses: Not hard at all   Food Insecurity: No Food Insecurity (3/3/2023)    Hunger Vital Sign     Worried About Running Out of Food

## 2023-10-02 NOTE — ASSESSMENT & PLAN NOTE
Neuro exam unremarkable  Likely r/t ongoing dizziness  Advised pt to f/u with cardiology and ENT for management

## 2023-10-02 NOTE — PATIENT INSTRUCTIONS
[facility] [address] [facility], [address]   [facility], [address] [place] [address]   [phone]

## 2023-10-02 NOTE — PROGRESS NOTES
Obtained consent from patient. Per verbal order from NP  Cheryl Injection of flu administered. Verified by me and Nasreen Blevins that this is the correct immunization/injection. Patient observed for 15 minutes with no adverse reaction.

## 2023-10-02 NOTE — ASSESSMENT & PLAN NOTE
Advised pt to discontinue injections prescribed while in [facility]   Continue daily PO vitamin B12 tablets  Discarded vitamin B12 injections in office  Recheck vitamin B12 levels

## 2023-10-03 NOTE — TELEPHONE ENCOUNTER
Pt no showed new pt visit for Dr. Mamadou Joaquin    Pt is [facility]. Office called to have last note faxed over.    [phone]

## 2023-10-05 ENCOUNTER — TELEPHONE (OUTPATIENT)
Facility: CLINIC | Age: 68
End: 2023-10-05

## 2023-10-05 NOTE — TELEPHONE ENCOUNTER
----- Message from Odin Villalobos sent at 10/5/2023  1:08 PM EDT -----  Subject: Referral Request    Reason for referral request? ENT Referral for Dizziness  Provider patient wants to be referred to(if known):     Provider Phone Number(if known):[phone]    Additional Information for Provider? Patient had called to schedule a   appointment they advised she would need a referral to be sent.  [facility]NT P [phone] F [phone]  ---------------------------------------------------------------------------  --------------  Debby Layton INFO    [phone]; OK to leave message on voicemail  ---------------------------------------------------------------------------  --------------

## 2023-10-06 ENCOUNTER — TELEPHONE (OUTPATIENT)
Facility: CLINIC | Age: 68
End: 2023-10-06

## 2023-10-06 NOTE — TELEPHONE ENCOUNTER
Spoke w/ Cisco Cassidy (dgt) and informed her I contacted [facility] and they accept [facility]. Cisco Cassidy was given contact info. Pt informed referral along w/ notes would be faxed today and that she may need to give until Monday. She verbalized understanding.

## 2023-10-06 NOTE — TELEPHONE ENCOUNTER
Pt  daughter called in stating the [facility] ent does not take her insurance and she wants another referral sent out

## 2023-10-23 NOTE — PROGRESS NOTES
Pharmacy Progress Note - Diabetes Management       Assessment / Plan:   Diabetes Management:  Per ADA guidelines, Pt's A1c is not at goal of < 7%. Pt's FBG values have all been elevated above goal.  Unable to assess her prandial control at this time. Encouraged increased SMBG checks. Will increase her Basaglar to 46 units qam to improve her basal control. Will increase her Ozempic to 0.5mg weekly as was previously prescribed to improve her glycemic control overall. Will reassess with increased SMBG logs at follow up in 5 weeks. Nutrition/Lifestyle Modifications:  - Educated pt on the importance of moderating carbohydrate intake. Reviewed sources of carbohydrates and method to help determine appropriate portion sizes (e.g., Diabetes Plate Method). - Advised patient to avoid sugar-sweetened beverages and replace with water or diet/zero sugar option.  - Recommend ~30 minutes consistent, moderately intensive, exercise/day or ~150 minutes/week. Start small, stay consistent, and increase length and types of exercise, as tolerated. Patient will return to clinic in 5 week(s) for follow up. S/O: Ms. Alisson Benitez, a 79 y.o. female referred by ANNIKA Adkins,  has a past medical history of OLEG (acute kidney injury) ([address]), CHF (congestive heart failure) ([address]), Diabetes ([address]), Hypertension, and NSTEMI (non-ST elevated myocardial infarction) ([address]). Pt was seen today for diabetes management. Patient's last A1c was:   Hemoglobin A1C   Date Value Ref Range Status   03/03/2023 9.8 (H) 4.2 - 5.6 % Final     Comment:     (NOTE)  HbA1C Interpretive Ranges  <5.7              Normal  5.7 - 6.4         Consider Prediabetes  >6.5              Consider Diabetes         Interim update: Pt was last seen by me on 7/5/2023. Per my prior note: Pt's A1c is not at goal of < 7%. Unable to truly assess her glycemic control d/t the variability of her FBG and pc breakfast values.   She is also not checking any

## 2023-10-26 ENCOUNTER — PHARMACY VISIT (OUTPATIENT)
Age: 68
End: 2023-10-26

## 2023-10-26 DIAGNOSIS — E11.65 TYPE 2 DIABETES MELLITUS WITH HYPERGLYCEMIA, WITHOUT LONG-TERM CURRENT USE OF INSULIN (HCC): ICD-10-CM

## 2023-10-26 DIAGNOSIS — E11.65 TYPE 2 DIABETES MELLITUS WITH HYPERGLYCEMIA, WITH LONG-TERM CURRENT USE OF INSULIN (HCC): Primary | ICD-10-CM

## 2023-10-26 DIAGNOSIS — Z79.4 TYPE 2 DIABETES MELLITUS WITH HYPERGLYCEMIA, WITH LONG-TERM CURRENT USE OF INSULIN (HCC): Primary | ICD-10-CM

## 2023-10-26 RX ORDER — INSULIN GLARGINE 100 [IU]/ML
46 INJECTION, SOLUTION SUBCUTANEOUS EVERY MORNING
Qty: 15 ML | Refills: 3 | Status: SHIPPED
Start: 2023-10-26

## 2023-10-26 RX ORDER — SEMAGLUTIDE 0.68 MG/ML
0.25 INJECTION, SOLUTION SUBCUTANEOUS
COMMUNITY
End: 2023-10-26 | Stop reason: DRUGHIGH

## 2023-10-26 RX ORDER — SEMAGLUTIDE 0.68 MG/ML
0.5 INJECTION, SOLUTION SUBCUTANEOUS
Qty: 3 ML | Refills: 1 | Status: SHIPPED | OUTPATIENT
Start: 2023-10-26
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
## 2023-11-14 ENCOUNTER — TELEPHONE (OUTPATIENT)
Facility: CLINIC | Age: 68
End: 2023-11-14

## 2023-11-14 NOTE — TELEPHONE ENCOUNTER
Pt called stating her pharm is having problems with there supply of   Semaglutide,0.25 or 0.5MG/DOS, (OZEMPIC, 0.25 OR 0.5 MG/DOSE,) 2 MG/3ML SOPN [[id number]]   And she wants to know what else can she take in place of that we can send in

## 2023-11-17 NOTE — TELEPHONE ENCOUNTER
Called patient pharmacy 3 times no answer. Called in regards to patient Ozempic to see what issues they are having so that provider would know what other steps to take. Will try back later.

## 2023-11-20 PROBLEM — H81.13 BENIGN PAROXYSMAL VERTIGO, BILATERAL: Status: ACTIVE | Noted: 2023-07-11

## 2023-11-21 ENCOUNTER — TELEPHONE (OUTPATIENT)
Facility: CLINIC | Age: 68
End: 2023-11-21

## 2023-11-29 ENCOUNTER — TELEPHONE (OUTPATIENT)
Age: 68
End: 2023-11-29

## 2023-11-29 NOTE — TELEPHONE ENCOUNTER
Pharmacy Progress Note - Telephone Call    Ms. Joie Rivas 67 y.o. was contacted via an outbound telephone call today to reschedule their missed appointment for PharmD diabetes management and education per referral from Cheryl Puga NP-C.      Please call pt to reschedule.  Please close encounter after scheduled.  If unable to reach pt after 3 documented attempts, please close encounter.    Thank you,    Noel Mari, PharmD, BCACP, BC-ADM

## 2023-12-19 ENCOUNTER — TELEPHONE (OUTPATIENT)
Facility: CLINIC | Age: 68
End: 2023-12-19

## 2023-12-19 NOTE — TELEPHONE ENCOUNTER
Patient called states that she has chest heaviness and shortness of  breath  patient was requesting an order for a chest xray to be placed by NP  Judy Singleton . I advised patient that with her symptoms it would be best for her to go to urgent care  or ed to be evaluated for these concerns . Patient agrees also wanted to schedule follow up on for jan 8th so she has something on the books for after the ed or urgent care trip scheduled jan 8th   45  min visit .

## 2023-12-23 DIAGNOSIS — I10 ESSENTIAL HYPERTENSION: ICD-10-CM

## 2023-12-26 RX ORDER — ROSUVASTATIN CALCIUM 20 MG/1
20 TABLET, COATED ORAL DAILY
Qty: 90 TABLET | Refills: 0 | Status: SHIPPED | OUTPATIENT
Start: 2023-12-26

## 2023-12-26 RX ORDER — CARVEDILOL 25 MG/1
25 TABLET ORAL 2 TIMES DAILY
Qty: 180 TABLET | Refills: 0 | Status: SHIPPED | OUTPATIENT
Start: 2023-12-26

## 2023-12-26 NOTE — TELEPHONE ENCOUNTER
Last Filled: Carvedilol 6/8/23 Rosuvastatin 6/30/23    Last appt: 10/2/23    Next appt:1/8/24    Labs:6/20/23      Additional Notes:

## 2024-01-02 DIAGNOSIS — R06.02 SHORTNESS OF BREATH: ICD-10-CM

## 2024-01-02 NOTE — TELEPHONE ENCOUNTER
Last Filled: 7/10/23    Last appt: 10/2/23    Next appt: 1/8/24    Labs: 6/20/23    UDS:    CSA:    Additional Notes: labs ordered back in October 2023

## 2024-01-03 RX ORDER — ALBUTEROL SULFATE 90 UG/1
AEROSOL, METERED RESPIRATORY (INHALATION)
Qty: 8.5 G | Refills: 1 | Status: SHIPPED | OUTPATIENT
Start: 2024-01-03

## 2024-02-05 DIAGNOSIS — R06.02 SHORTNESS OF BREATH: ICD-10-CM

## 2024-02-07 RX ORDER — ALBUTEROL SULFATE 90 UG/1
AEROSOL, METERED RESPIRATORY (INHALATION)
Qty: 8.5 G | Refills: 1 | Status: SHIPPED | OUTPATIENT
Start: 2024-02-07

## 2024-02-07 ASSESSMENT — ENCOUNTER SYMPTOMS: SHORTNESS OF BREATH: 0

## 2024-02-07 NOTE — TELEPHONE ENCOUNTER
Last seen 10/02/2023  Last labs 6/30/23  Last filled  01/03/2024  Next appointment 2/8/24    Lab Results   Component Value Date     06/30/2023    K 4.3 06/30/2023     06/30/2023    CO2 30 06/30/2023    BUN 55 (H) 06/30/2023    CREATININE 1.78 (H) 06/30/2023    GLUCOSE 277 (H) 06/30/2023    CALCIUM 8.8 06/30/2023    PROT 7.0 06/20/2023    LABALBU 3.2 (L) 06/30/2023    BILITOT 0.3 06/20/2023    ALKPHOS 50 06/20/2023    AST 18 06/20/2023    ALT 28 06/20/2023    LABGLOM 31 (L) 06/30/2023    AGRATIO 0.9 06/20/2023    GLOB 3.7 06/20/2023

## 2024-02-07 NOTE — ASSESSMENT & PLAN NOTE
Continue current regimen  Advised pt to follow up with pharmacy for asasistance with management  Advised pt to complete fasting labs

## 2024-02-07 NOTE — PROGRESS NOTES
Chief Complaint   Patient presents with    Hypertension     Pt states her bp readings at home have been okay at home    Diabetes    Postmenopausal    Swelling     Pt c/o swelling in lower leg and feet bilaterally onset 2 weeks ago. Swelling is the same in morning and night. Chest pain is felt when elevating legs. Treating with bumex.     Chest Pain     Pt c/o having chest pain onset 4 months, Pt had a catherization 4 months ago any time she lifts legs she feels chest pain. Pain feels like pressure and tightness sometimes has difficulty swallowing. Chest pain goes away after putting legs down. Aggravated with leg left. No relieving or treatment or sob. Doesn't know when the next f/u with Cardiologist is.      Assessment & Plan:     1. Essential hypertension  Assessment & Plan:  BP acceptable, goal <130/80, continue regimen  2. Localized swelling of both lower legs  Assessment & Plan:  Worsening, continue bumex 1 mg BID  Advised daughter, granddaughter, and patient to follow up with cardiology and nephrology for management  Orders:  -     Vascular duplex lower extremity venous bilateral; Future  3. Type 2 diabetes mellitus with hyperglycemia, without long-term current use of insulin (HCC)  Assessment & Plan:  Continue current regimen  Advised pt to follow up with pharmacy for asasistance with management  Advised pt to complete fasting labs   Orders:  -     Amb External Referral To Ophthalmology  4. Need for prophylactic vaccination against Streptococcus pneumoniae (pneumococcus)  -     Pneumococcal, PCV20, PREVNAR 20, (age 6w+), IM, PF  5. Screen for colon cancer  -     Amb External Referral To Gastroenterology  6. Postmenopausal  -     DEXA BONE DENSITY AXIAL SKELETON; Future    Follow-up and Dispositions    Return in about 4 weeks (around 3/7/2024) for Diabetes, Cholesterol, Lab results.       Subjective:     Pt speaks the language, Irish, the  services were used to conduct this visit.  number

## 2024-02-08 ENCOUNTER — OFFICE VISIT (OUTPATIENT)
Facility: CLINIC | Age: 69
End: 2024-02-08
Payer: MEDICARE

## 2024-02-08 VITALS
OXYGEN SATURATION: 98 % | HEART RATE: 69 BPM | RESPIRATION RATE: 17 BRPM | BODY MASS INDEX: 43.19 KG/M2 | DIASTOLIC BLOOD PRESSURE: 66 MMHG | WEIGHT: 220 LBS | SYSTOLIC BLOOD PRESSURE: 143 MMHG | HEIGHT: 60 IN | TEMPERATURE: 97.8 F

## 2024-02-08 DIAGNOSIS — E11.65 TYPE 2 DIABETES MELLITUS WITH HYPERGLYCEMIA, WITHOUT LONG-TERM CURRENT USE OF INSULIN (HCC): ICD-10-CM

## 2024-02-08 DIAGNOSIS — Z78.0 POSTMENOPAUSAL: ICD-10-CM

## 2024-02-08 DIAGNOSIS — I10 ESSENTIAL HYPERTENSION: Primary | ICD-10-CM

## 2024-02-08 DIAGNOSIS — R22.43 LOCALIZED SWELLING OF BOTH LOWER LEGS: ICD-10-CM

## 2024-02-08 DIAGNOSIS — Z23 NEED FOR PROPHYLACTIC VACCINATION AGAINST STREPTOCOCCUS PNEUMONIAE (PNEUMOCOCCUS): ICD-10-CM

## 2024-02-08 DIAGNOSIS — Z12.11 SCREEN FOR COLON CANCER: ICD-10-CM

## 2024-02-08 PROCEDURE — G8400 PT W/DXA NO RESULTS DOC: HCPCS

## 2024-02-08 PROCEDURE — G0009 ADMIN PNEUMOCOCCAL VACCINE: HCPCS

## 2024-02-08 PROCEDURE — 3077F SYST BP >= 140 MM HG: CPT

## 2024-02-08 PROCEDURE — 1090F PRES/ABSN URINE INCON ASSESS: CPT

## 2024-02-08 PROCEDURE — 1036F TOBACCO NON-USER: CPT

## 2024-02-08 PROCEDURE — G8427 DOCREV CUR MEDS BY ELIG CLIN: HCPCS

## 2024-02-08 PROCEDURE — G8484 FLU IMMUNIZE NO ADMIN: HCPCS

## 2024-02-08 PROCEDURE — 99215 OFFICE O/P EST HI 40 MIN: CPT

## 2024-02-08 PROCEDURE — G8417 CALC BMI ABV UP PARAM F/U: HCPCS

## 2024-02-08 PROCEDURE — 2022F DILAT RTA XM EVC RTNOPTHY: CPT

## 2024-02-08 PROCEDURE — 3017F COLORECTAL CA SCREEN DOC REV: CPT

## 2024-02-08 PROCEDURE — 90677 PCV20 VACCINE IM: CPT

## 2024-02-08 PROCEDURE — 3078F DIAST BP <80 MM HG: CPT

## 2024-02-08 PROCEDURE — 1123F ACP DISCUSS/DSCN MKR DOCD: CPT

## 2024-02-08 PROCEDURE — 3046F HEMOGLOBIN A1C LEVEL >9.0%: CPT

## 2024-02-08 RX ORDER — AMLODIPINE BESYLATE 10 MG/1
10 TABLET ORAL NIGHTLY
COMMUNITY
Start: 2023-12-08

## 2024-02-08 ASSESSMENT — PATIENT HEALTH QUESTIONNAIRE - PHQ9
SUM OF ALL RESPONSES TO PHQ QUESTIONS 1-9: 0
2. FEELING DOWN, DEPRESSED OR HOPELESS: 0
SUM OF ALL RESPONSES TO PHQ9 QUESTIONS 1 & 2: 0
SUM OF ALL RESPONSES TO PHQ QUESTIONS 1-9: 0
1. LITTLE INTEREST OR PLEASURE IN DOING THINGS: 0

## 2024-02-08 NOTE — PROGRESS NOTES
Joie Rivas presents today for   Chief Complaint   Patient presents with    Hypertension     Pt states her bp readings at home have been okay at home    Diabetes    Postmenopausal    Swelling     Pt c/o swelling in lower leg and feet bilaterally onset 2 weeks ago. Swelling is the same in morning and night. Chest pain is felt when elevating legs. Treating with bumex.     Chest Pain     Pt c/o having chest pain onset 4 months, Pt had a catherization 4 months ago any time she lifts legs she feels chest pain. Pain feels like pressure and tightness sometimes has difficulty swallowing. Chest pain goes away after putting legs down. Aggravated with leg left. No relieving or treatment or sob. Doesn't know when the next f/u with Cardiologist is.        Is someone accompanying this pt?     Is the patient using any DME equipment during OV? no    Depression Screenin/8/2024    11:21 AM 7/10/2023     3:19 PM 2023    11:20 AM 2023     4:17 PM 3/3/2023    11:35 AM 2/3/2023     3:39 PM   PHQ-9 Questionaire   Little interest or pleasure in doing things 0 0 0 0 1 0   Feeling down, depressed, or hopeless 0 0 0 0 0 0   PHQ-9 Total Score 0 0 0 0 1 0        LIOR 7-Anxiety        No data to display                   Learning Assessment:  No question data found.     Fall Risk       No data to display                   Travel Screening:    Travel Screening       Question Response    Have you been in contact with someone who was sick? No / Unsure    Do you have any of the following new or worsening symptoms? None of these    Have you traveled internationally or domestically in the last month? No          Travel History   Travel since 24    No documented travel since 24          Health Maintenance reviewed and discussed and ordered per Provider.  Transportation Needs: Unknown (3/3/2023)    PRAPARE - Transportation     Lack of Transportation (Medical): Not on file     Lack of Transportation (Non-Medical): No

## 2024-02-08 NOTE — ASSESSMENT & PLAN NOTE
Worsening, continue bumex 1 mg BID  Advised daughter, granddaughter, and patient to follow up with cardiology and nephrology for management

## 2024-02-14 ENCOUNTER — HOSPITAL ENCOUNTER (OUTPATIENT)
Facility: HOSPITAL | Age: 69
Discharge: HOME | End: 2024-02-16
Payer: MEDICARE

## 2024-02-14 DIAGNOSIS — R22.43 LOCALIZED SWELLING OF BOTH LOWER LEGS: ICD-10-CM

## 2024-02-14 PROCEDURE — 93970 EXTREMITY STUDY: CPT

## 2024-02-21 ENCOUNTER — HOSPITAL ENCOUNTER (OUTPATIENT)
Facility: HOSPITAL | Age: 69
Discharge: HOME | End: 2024-02-24
Payer: MEDICARE

## 2024-02-21 DIAGNOSIS — Z78.0 POSTMENOPAUSAL: ICD-10-CM

## 2024-02-21 PROCEDURE — 77080 DXA BONE DENSITY AXIAL: CPT

## 2024-02-27 ENCOUNTER — HOSPITAL ENCOUNTER (OUTPATIENT)
Facility: HOSPITAL | Age: 69
Discharge: HOME | End: 2024-03-01
Payer: MEDICARE

## 2024-02-27 LAB
25(OH)D3 SERPL-MCNC: 40.1 NG/ML (ref 30–100)
ALBUMIN SERPL-MCNC: 3.3 G/DL (ref 3.4–5)
ANION GAP SERPL CALC-SCNC: 4 MMOL/L (ref 3–18)
APPEARANCE UR: CLEAR
BACTERIA URNS QL MICRO: NEGATIVE /HPF
BILIRUB UR QL: NEGATIVE
BUN SERPL-MCNC: 42 MG/DL (ref 7–18)
BUN/CREAT SERPL: 20 (ref 12–20)
CALCIUM SERPL-MCNC: 8.8 MG/DL (ref 8.5–10.1)
CALCIUM SERPL-MCNC: 9.2 MG/DL (ref 8.5–10.1)
CHLORIDE SERPL-SCNC: 102 MMOL/L (ref 100–111)
CO2 SERPL-SCNC: 32 MMOL/L (ref 21–32)
COLOR UR: YELLOW
CREAT SERPL-MCNC: 2.13 MG/DL (ref 0.6–1.3)
CREAT UR-MCNC: 51 MG/DL (ref 30–125)
EPITH CASTS URNS QL MICRO: NORMAL /LPF (ref 0–5)
ERYTHROCYTE [DISTWIDTH] IN BLOOD BY AUTOMATED COUNT: 12.1 % (ref 11.6–14.5)
GLUCOSE SERPL-MCNC: 160 MG/DL (ref 74–99)
GLUCOSE UR STRIP.AUTO-MCNC: NEGATIVE MG/DL
HCT VFR BLD AUTO: 32.5 % (ref 35–45)
HGB BLD-MCNC: 10.3 G/DL (ref 12–16)
HGB UR QL STRIP: NEGATIVE
KETONES UR QL STRIP.AUTO: NEGATIVE MG/DL
LEUKOCYTE ESTERASE UR QL STRIP.AUTO: ABNORMAL
MCH RBC QN AUTO: 24.9 PG (ref 24–34)
MCHC RBC AUTO-ENTMCNC: 31.7 G/DL (ref 31–37)
MCV RBC AUTO: 78.7 FL (ref 78–100)
MICROALBUMIN UR-MCNC: 14.2 MG/DL (ref 0–3)
MICROALBUMIN/CREAT UR-RTO: 278 MG/G (ref 0–30)
NITRITE UR QL STRIP.AUTO: NEGATIVE
NRBC # BLD: 0 K/UL (ref 0–0.01)
NRBC BLD-RTO: 0 PER 100 WBC
PH UR STRIP: 6 (ref 5–8)
PHOSPHATE SERPL-MCNC: 3.5 MG/DL (ref 2.5–4.9)
PLATELET # BLD AUTO: 240 K/UL (ref 135–420)
PMV BLD AUTO: 11.9 FL (ref 9.2–11.8)
POTASSIUM SERPL-SCNC: 4.4 MMOL/L (ref 3.5–5.5)
PROT UR STRIP-MCNC: ABNORMAL MG/DL
PTH-INTACT SERPL-MCNC: 194.7 PG/ML (ref 18.4–88)
RBC # BLD AUTO: 4.13 M/UL (ref 4.2–5.3)
RBC #/AREA URNS HPF: NEGATIVE /HPF (ref 0–5)
SODIUM SERPL-SCNC: 138 MMOL/L (ref 136–145)
SP GR UR REFRACTOMETRY: 1.01 (ref 1–1.03)
UROBILINOGEN UR QL STRIP.AUTO: 0.2 EU/DL (ref 0.2–1)
WBC # BLD AUTO: 4.9 K/UL (ref 4.6–13.2)
WBC URNS QL MICRO: NORMAL /HPF (ref 0–4)

## 2024-02-27 PROCEDURE — 36415 COLL VENOUS BLD VENIPUNCTURE: CPT

## 2024-02-27 PROCEDURE — 81001 URINALYSIS AUTO W/SCOPE: CPT

## 2024-02-27 PROCEDURE — 83970 ASSAY OF PARATHORMONE: CPT

## 2024-02-27 PROCEDURE — 82306 VITAMIN D 25 HYDROXY: CPT

## 2024-02-27 PROCEDURE — 85027 COMPLETE CBC AUTOMATED: CPT

## 2024-02-27 PROCEDURE — 80069 RENAL FUNCTION PANEL: CPT

## 2024-02-27 PROCEDURE — 82570 ASSAY OF URINE CREATININE: CPT

## 2024-02-27 PROCEDURE — 82043 UR ALBUMIN QUANTITATIVE: CPT

## 2024-03-04 ENCOUNTER — HOSPITAL ENCOUNTER (OUTPATIENT)
Facility: HOSPITAL | Age: 69
Discharge: HOME | End: 2024-03-07
Payer: MEDICARE

## 2024-03-04 DIAGNOSIS — I10 ESSENTIAL HYPERTENSION: ICD-10-CM

## 2024-03-04 DIAGNOSIS — E11.65 TYPE 2 DIABETES MELLITUS WITH HYPERGLYCEMIA, WITHOUT LONG-TERM CURRENT USE OF INSULIN (HCC): ICD-10-CM

## 2024-03-04 DIAGNOSIS — E53.8 VITAMIN B12 DEFICIENCY: ICD-10-CM

## 2024-03-04 LAB
ALBUMIN SERPL-MCNC: 3.5 G/DL (ref 3.4–5)
ALBUMIN/GLOB SERPL: 1 (ref 0.8–1.7)
ALP SERPL-CCNC: 58 U/L (ref 45–117)
ALT SERPL-CCNC: 20 U/L (ref 13–56)
ANION GAP SERPL CALC-SCNC: 4 MMOL/L (ref 3–18)
AST SERPL-CCNC: 16 U/L (ref 10–38)
BASOPHILS # BLD: 0 K/UL (ref 0–0.1)
BASOPHILS NFR BLD: 1 % (ref 0–2)
BILIRUB SERPL-MCNC: 0.3 MG/DL (ref 0.2–1)
BUN SERPL-MCNC: 35 MG/DL (ref 7–18)
BUN/CREAT SERPL: 17 (ref 12–20)
CALCIUM SERPL-MCNC: 9.2 MG/DL (ref 8.5–10.1)
CHLORIDE SERPL-SCNC: 103 MMOL/L (ref 100–111)
CHOLEST SERPL-MCNC: 109 MG/DL
CO2 SERPL-SCNC: 30 MMOL/L (ref 21–32)
CREAT SERPL-MCNC: 2.03 MG/DL (ref 0.6–1.3)
DIFFERENTIAL METHOD BLD: ABNORMAL
EOSINOPHIL # BLD: 0.3 K/UL (ref 0–0.4)
EOSINOPHIL NFR BLD: 9 % (ref 0–5)
ERYTHROCYTE [DISTWIDTH] IN BLOOD BY AUTOMATED COUNT: 12.3 % (ref 11.6–14.5)
EST. AVERAGE GLUCOSE BLD GHB EST-MCNC: 214 MG/DL
GLOBULIN SER CALC-MCNC: 3.5 G/DL (ref 2–4)
GLUCOSE SERPL-MCNC: 135 MG/DL (ref 74–99)
HBA1C MFR BLD: 9.1 % (ref 4.2–5.6)
HCT VFR BLD AUTO: 33.4 % (ref 35–45)
HDLC SERPL-MCNC: 39 MG/DL (ref 40–60)
HDLC SERPL: 2.8 (ref 0–5)
HGB BLD-MCNC: 10.6 G/DL (ref 12–16)
IMM GRANULOCYTES # BLD AUTO: 0 K/UL (ref 0–0.04)
IMM GRANULOCYTES NFR BLD AUTO: 0 % (ref 0–0.5)
LDLC SERPL CALC-MCNC: 53.2 MG/DL (ref 0–100)
LIPID PANEL: ABNORMAL
LYMPHOCYTES # BLD: 1.5 K/UL (ref 0.9–3.6)
LYMPHOCYTES NFR BLD: 38 % (ref 21–52)
MCH RBC QN AUTO: 25.2 PG (ref 24–34)
MCHC RBC AUTO-ENTMCNC: 31.7 G/DL (ref 31–37)
MCV RBC AUTO: 79.3 FL (ref 78–100)
MONOCYTES # BLD: 0.4 K/UL (ref 0.05–1.2)
MONOCYTES NFR BLD: 11 % (ref 3–10)
NEUTS SEG # BLD: 1.6 K/UL (ref 1.8–8)
NEUTS SEG NFR BLD: 42 % (ref 40–73)
NRBC # BLD: 0 K/UL (ref 0–0.01)
NRBC BLD-RTO: 0 PER 100 WBC
PLATELET # BLD AUTO: 238 K/UL (ref 135–420)
PMV BLD AUTO: 11.2 FL (ref 9.2–11.8)
POTASSIUM SERPL-SCNC: 5.1 MMOL/L (ref 3.5–5.5)
PROT SERPL-MCNC: 7 G/DL (ref 6.4–8.2)
RBC # BLD AUTO: 4.21 M/UL (ref 4.2–5.3)
SODIUM SERPL-SCNC: 137 MMOL/L (ref 136–145)
TRIGL SERPL-MCNC: 84 MG/DL
VIT B12 SERPL-MCNC: >2000 PG/ML (ref 211–911)
VLDLC SERPL CALC-MCNC: 16.8 MG/DL
WBC # BLD AUTO: 3.9 K/UL (ref 4.6–13.2)

## 2024-03-04 PROCEDURE — 82607 VITAMIN B-12: CPT

## 2024-03-04 PROCEDURE — 36415 COLL VENOUS BLD VENIPUNCTURE: CPT

## 2024-03-04 PROCEDURE — 80061 LIPID PANEL: CPT

## 2024-03-04 PROCEDURE — 83036 HEMOGLOBIN GLYCOSYLATED A1C: CPT

## 2024-03-04 PROCEDURE — 85025 COMPLETE CBC W/AUTO DIFF WBC: CPT

## 2024-03-04 PROCEDURE — 80053 COMPREHEN METABOLIC PANEL: CPT

## 2024-03-07 PROBLEM — R74.8 ELEVATED VITAMIN B12 LEVEL: Status: ACTIVE | Noted: 2023-10-02

## 2024-03-07 PROBLEM — R79.89 ELEVATED VITAMIN B12 LEVEL: Status: ACTIVE | Noted: 2023-10-02

## 2024-03-07 ASSESSMENT — ENCOUNTER SYMPTOMS: SHORTNESS OF BREATH: 0

## 2024-03-07 NOTE — ASSESSMENT & PLAN NOTE
Venous duplex negative for venous insufficiency to bilateral lower extremities  Continue bumex 1 mg BID, managed by nephrology  Advised pt to wear compression stockings daily, elevated bilateral lower extremities when laying down or sitting, and to limit intake of foods high in salt

## 2024-03-07 NOTE — PROGRESS NOTES
Chief Complaint   Patient presents with    Chronic Kidney Disease    Diabetes     Blood sugar taken this morning was 106     Leukopenia    Hyperlipidemia    Leg Swelling    Elevated vitamin B12     Hypertension    Postmenopausal    Discuss Labs     3/4/24     Assessment & Plan:     1. Stage 3a chronic kidney disease (HCC)  Assessment & Plan:  Slight improvement, remains low  Continue management per nephrology  2. Type 2 diabetes mellitus with hyperglycemia, without long-term current use of insulin (HCC)  Assessment & Plan:  A1c improving, goal <7  Continue ozempic 0.5 mg weekly  Continue lantus 46 units daily, switch to night time injections  Advised pt on lifestyle modifications  Appreciate pharmacy's assistance with management  Recheck POC A1c at next visit  Orders:  -     Comprehensive Metabolic Panel; Future  -     Hemoglobin A1C; Future  3. Leukopenia, unspecified type  Assessment & Plan:  Recheck in 3 months   If wbc remains low or worsens, referral to heme/onc for management  Orders:  -     CBC with Auto Differential; Future  -     Comprehensive Metabolic Panel; Future  4. Hyperlipidemia associated with type 2 diabetes mellitus (HCC)  Assessment & Plan:  At goal, LDL goal <70  Continue rosuvastatin 20 mg daily  Recheck lipid panel in 6 months  Orders:  -     Comprehensive Metabolic Panel; Future  5. Localized swelling of both lower legs  Assessment & Plan:  Venous duplex negative for venous insufficiency to bilateral lower extremities  Continue bumex 1 mg BID, managed by nephrology  Advised pt to wear compression stockings daily, elevated bilateral lower extremities when laying down or sitting, and to limit intake of foods high in salt  6. Elevated vitamin B12 level  Assessment & Plan:  Discontinue vitamin B12 daily tablets  7. Essential hypertension  Assessment & Plan:  BP at goal, <130/80, continue carvedilol 25 mg BID, hydralazine 100 mg BID, bumex 1 mg BID  8. Postmenopausal  Comments:  Reviewed DEXA scan

## 2024-03-07 NOTE — ASSESSMENT & PLAN NOTE
A1c improving, goal <7  Continue ozempic 0.5 mg weekly  Continue lantus 46 units daily, switch to night time injections  Advised pt on lifestyle modifications  Appreciate pharmacy's assistance with management  Recheck POC A1c at next visit

## 2024-03-07 NOTE — PATIENT INSTRUCTIONS
Schedule appointment with ophthalmology for Diabetic Eye Exam.   Dr Abhishek Verma  [address]  [place] [address]  Phone:  [phone]  Fax:  [phone]    Schedule appointment for Colonoscopy  Dr Ronnie Mata   [address]   [place] [address]   Phone [phone]   Fax [phone]

## 2024-03-08 ENCOUNTER — TELEPHONE (OUTPATIENT)
Facility: CLINIC | Age: 69
End: 2024-03-08

## 2024-03-08 ENCOUNTER — OFFICE VISIT (OUTPATIENT)
Facility: CLINIC | Age: 69
End: 2024-03-08
Payer: MEDICARE

## 2024-03-08 VITALS
HEART RATE: 78 BPM | WEIGHT: 222 LBS | HEIGHT: 60 IN | RESPIRATION RATE: 16 BRPM | OXYGEN SATURATION: 99 % | DIASTOLIC BLOOD PRESSURE: 62 MMHG | TEMPERATURE: 97.6 F | SYSTOLIC BLOOD PRESSURE: 112 MMHG | BODY MASS INDEX: 43.59 KG/M2

## 2024-03-08 DIAGNOSIS — E78.5 HYPERLIPIDEMIA ASSOCIATED WITH TYPE 2 DIABETES MELLITUS (HCC): ICD-10-CM

## 2024-03-08 DIAGNOSIS — I10 ESSENTIAL HYPERTENSION: ICD-10-CM

## 2024-03-08 DIAGNOSIS — Z71.2 ENCOUNTER TO DISCUSS TEST RESULTS: ICD-10-CM

## 2024-03-08 DIAGNOSIS — E11.69 HYPERLIPIDEMIA ASSOCIATED WITH TYPE 2 DIABETES MELLITUS (HCC): ICD-10-CM

## 2024-03-08 DIAGNOSIS — D72.819 LEUKOPENIA, UNSPECIFIED TYPE: ICD-10-CM

## 2024-03-08 DIAGNOSIS — Z78.0 POSTMENOPAUSAL: ICD-10-CM

## 2024-03-08 DIAGNOSIS — R22.43 LOCALIZED SWELLING OF BOTH LOWER LEGS: ICD-10-CM

## 2024-03-08 DIAGNOSIS — R74.8 ELEVATED VITAMIN B12 LEVEL: ICD-10-CM

## 2024-03-08 DIAGNOSIS — E11.65 TYPE 2 DIABETES MELLITUS WITH HYPERGLYCEMIA, WITHOUT LONG-TERM CURRENT USE OF INSULIN (HCC): ICD-10-CM

## 2024-03-08 DIAGNOSIS — N18.31 STAGE 3A CHRONIC KIDNEY DISEASE (HCC): Primary | ICD-10-CM

## 2024-03-08 PROCEDURE — 1123F ACP DISCUSS/DSCN MKR DOCD: CPT

## 2024-03-08 PROCEDURE — 3074F SYST BP LT 130 MM HG: CPT

## 2024-03-08 PROCEDURE — 3046F HEMOGLOBIN A1C LEVEL >9.0%: CPT

## 2024-03-08 PROCEDURE — 3078F DIAST BP <80 MM HG: CPT

## 2024-03-08 PROCEDURE — 99215 OFFICE O/P EST HI 40 MIN: CPT

## 2024-03-08 RX ORDER — SEMAGLUTIDE 0.68 MG/ML
0.5 INJECTION, SOLUTION SUBCUTANEOUS
Qty: 3 ML | Refills: 1 | Status: CANCELLED | OUTPATIENT
Start: 2024-03-08

## 2024-03-08 SDOH — ECONOMIC STABILITY: INCOME INSECURITY: HOW HARD IS IT FOR YOU TO PAY FOR THE VERY BASICS LIKE FOOD, HOUSING, MEDICAL CARE, AND HEATING?: NOT HARD AT ALL

## 2024-03-08 SDOH — ECONOMIC STABILITY: FOOD INSECURITY: WITHIN THE PAST 12 MONTHS, THE FOOD YOU BOUGHT JUST DIDN'T LAST AND YOU DIDN'T HAVE MONEY TO GET MORE.: NEVER TRUE

## 2024-03-08 SDOH — ECONOMIC STABILITY: FOOD INSECURITY: WITHIN THE PAST 12 MONTHS, YOU WORRIED THAT YOUR FOOD WOULD RUN OUT BEFORE YOU GOT MONEY TO BUY MORE.: NEVER TRUE

## 2024-03-08 ASSESSMENT — PATIENT HEALTH QUESTIONNAIRE - PHQ9
2. FEELING DOWN, DEPRESSED OR HOPELESS: 0
1. LITTLE INTEREST OR PLEASURE IN DOING THINGS: 0
SUM OF ALL RESPONSES TO PHQ QUESTIONS 1-9: 0
SUM OF ALL RESPONSES TO PHQ9 QUESTIONS 1 & 2: 0

## 2024-03-08 NOTE — PROGRESS NOTES
Soniamary Rivas presents today for   Chief Complaint   Patient presents with    Chronic Kidney Disease    Diabetes     Blood sugar taken this morning was 106     Leukopenia    Hyperlipidemia    Leg Swelling    Elevated vitamin B12     Hypertension    Postmenopausal    Discuss Labs     3/4/24       Is someone accompanying this pt? Granddaughter    Is the patient using any DME equipment during OV? no    Depression Screening:      3/8/2024    10:17 AM 2/8/2024    11:21 AM 7/10/2023     3:19 PM 6/8/2023    11:20 AM 5/8/2023     4:17 PM 3/3/2023    11:35 AM 2/3/2023     3:39 PM   PHQ-9 Questionaire   Little interest or pleasure in doing things 0 0 0 0 0 1 0   Feeling down, depressed, or hopeless 0 0 0 0 0 0 0   PHQ-9 Total Score 0 0 0 0 0 1 0        LIOR 7-Anxiety        No data to display                   Learning Assessment:  No question data found.     Fall Risk       No data to display                   Travel Screening:    Travel Screening     No screening recorded since 03/07/24 0000       Travel History   Travel since 02/08/24    No documented travel since 02/08/24            Health Maintenance reviewed and discussed and ordered per Provider.  Transportation Needs: Unknown (3/8/2024)    PRAPARE - Transportation     Lack of Transportation (Medical): Not on file     Lack of Transportation (Non-Medical): No      Food Insecurity: No Food Insecurity (3/8/2024)    Hunger Vital Sign     Worried About Running Out of Food in the Last Year: Never true     Ran Out of Food in the Last Year: Never true     Financial Resource Strain: Low Risk  (3/8/2024)    Overall Financial Resource Strain (CARDIA)     Difficulty of Paying Living Expenses: Not hard at all     Housing Stability: Unknown (3/8/2024)    Housing Stability Vital Sign     Unable to Pay for Housing in the Last Year: Not on file     Number of Places Lived in the Last Year: Not on file     Unstable Housing in the Last Year: No       Did you provide resources if

## 2024-03-08 NOTE — TELEPHONE ENCOUNTER
Please advise daughter, pt needs to take insulin glargine 46 units at night while fasting, instead of the morning. Continue to take ozempic 0.5 mg weekly. Thank you!

## 2024-03-14 ENCOUNTER — TELEPHONE (OUTPATIENT)
Facility: CLINIC | Age: 69
End: 2024-03-14

## 2024-03-14 NOTE — TELEPHONE ENCOUNTER
Please advise daughter, patient's kidney function slightly improved but is still elevated; and to call nephrology to see if bumex needs to be adjusted.   Per nephrology's note, plan to decrease bumex dose from BID to once daily if her kidney function did not improve.     Thank you!

## 2024-03-20 PROBLEM — J18.9 COMMUNITY ACQUIRED PNEUMONIA OF RIGHT LOWER LOBE OF LUNG: Status: ACTIVE | Noted: 2024-03-11

## 2024-03-20 ASSESSMENT — ENCOUNTER SYMPTOMS: SHORTNESS OF BREATH: 0

## 2024-03-20 NOTE — PROGRESS NOTES
Joie SEPIDEH Evelyn presents today for   Chief Complaint   Patient presents with    Follow-Up from Hospital       Is someone accompanying this pt? [facility]    Is the patient using any DME equipment during OV? cane    Depression Screening:      3/8/2024    10:17 AM 2/8/2024    11:21 AM 7/10/2023     3:19 PM 6/8/2023    11:20 AM 5/8/2023     4:17 PM 3/3/2023    11:35 AM 2/3/2023     3:39 PM   PHQ-9 Questionaire   Little interest or pleasure in doing things 0 0 0 0 0 1 0   Feeling down, depressed, or hopeless 0 0 0 0 0 0 0   PHQ-9 Total Score 0 0 0 0 0 1 0        LIOR 7-Anxiety        No data to display                   Learning Assessment:  No question data found.     Fall Risk       No data to display                   Travel Screening:    Travel Screening     No screening recorded since 03/17/24 1459       Travel History   Travel since 02/18/24    No documented travel since 02/18/24            Health Maintenance reviewed and discussed and ordered per Provider.  Transportation Needs: Unknown (3/8/2024)    PRAPARE - Transportation     Lack of Transportation (Medical): Not on file     Lack of Transportation (Non-Medical): No      Food Insecurity: No Food Insecurity (3/8/2024)    Hunger Vital Sign     Worried About Running Out of Food in the Last Year: Never true     Ran Out of Food in the Last Year: Never true     Financial Resource Strain: Low Risk  (3/8/2024)    Overall Financial Resource Strain (CARDIA)     Difficulty of Paying Living Expenses: Not hard at all     Housing Stability: Unknown (3/8/2024)    Housing Stability Vital Sign     Unable to Pay for Housing in the Last Year: Not on file     Number of Places Lived in the Last Year: Not on file     Unstable Housing in the Last Year: No       Did you provide resources if patient requested them? no      Health Maintenance Due   Topic Date Due    COVID-19 Vaccine (1) Never done    Diabetic foot exam  Never done    Diabetic retinal exam  Never done    Colorectal 
and fever. Initial workup significant for pneumonia and hypoxia with ambulation, she was referred to hospital medicine for admission, further evaluation, and continued medical management. Mrs. Rivas speaks Uz[place], she requests that her granddaughter translates for her, she declines official . Upon assessment Mrs. Rivas reports 2 days of increasing shortness of breath, cough, fever, and chest tightness. She also has had difficulty swallowing for the past month and occasionally coughs after eating and drinking. She has had swelling of her bilateral lower extremities for about 2 weeks as well. She denies any abdominal pain, nausea, vomiting, diarrhea, urinary symptoms, or sick contacts   Patient managed conservatively in the hospital with broad-spectrum antibiotic follow the culture results. Patient is on supplemental oxygen. Patient shortness of breath improved cough improved. Patient ambulated and did not require any supplemental oxygen. Patient other comorbid condition managed conservative in the hospital. Patient swallow screen done by the speech therapist able to swallow fine. Patient afebrile hemodynamically okay ready discharge home with instruction to follow-up PCP and primary nephrology in the office. Patient also instructed to restrict the fluid because of underlying CKD and CHF high risk of fluid retention leading to respiratory failure     Interval history/Current status: Patient presents today for hospital follow-up.    Admitting symptoms have: improved. States sometimes she changes her position, she feels dizzy. States she has never smoked.     New Medications at Discharge:  amoxicillin-clavulanate 875-125 mg Tabs  End date: March 19, 2024  Take 1 Tab by Mouth Every 12 hours for 5 days. with food or milk.    Doxycycline Hyclate 100 mg Caps  End date: March 19, 2024  Take 1 Cap by Mouth Every 12 hours for 5 days.    guaiFENesin 100 mg/5 mL Liqd  Take 10 mL by Mouth Every 4 Hours As Needed for

## 2024-03-21 ENCOUNTER — OFFICE VISIT (OUTPATIENT)
Facility: CLINIC | Age: 69
End: 2024-03-21
Payer: MEDICARE

## 2024-03-21 VITALS
SYSTOLIC BLOOD PRESSURE: 120 MMHG | WEIGHT: 222 LBS | RESPIRATION RATE: 16 BRPM | HEIGHT: 60 IN | TEMPERATURE: 97.3 F | DIASTOLIC BLOOD PRESSURE: 69 MMHG | OXYGEN SATURATION: 99 % | HEART RATE: 75 BPM | BODY MASS INDEX: 43.59 KG/M2

## 2024-03-21 DIAGNOSIS — E11.65 TYPE 2 DIABETES MELLITUS WITH HYPERGLYCEMIA, WITHOUT LONG-TERM CURRENT USE OF INSULIN (HCC): ICD-10-CM

## 2024-03-21 DIAGNOSIS — Z09 HOSPITAL DISCHARGE FOLLOW-UP: ICD-10-CM

## 2024-03-21 DIAGNOSIS — J18.9 COMMUNITY ACQUIRED PNEUMONIA OF RIGHT LOWER LOBE OF LUNG: Primary | ICD-10-CM

## 2024-03-21 PROCEDURE — 1111F DSCHRG MED/CURRENT MED MERGE: CPT

## 2024-03-21 PROCEDURE — 3078F DIAST BP <80 MM HG: CPT

## 2024-03-21 PROCEDURE — 3074F SYST BP LT 130 MM HG: CPT

## 2024-03-21 PROCEDURE — 3046F HEMOGLOBIN A1C LEVEL >9.0%: CPT

## 2024-03-21 PROCEDURE — 1123F ACP DISCUSS/DSCN MKR DOCD: CPT

## 2024-03-21 PROCEDURE — 99215 OFFICE O/P EST HI 40 MIN: CPT

## 2024-03-21 RX ORDER — OMEPRAZOLE 40 MG/1
40 CAPSULE, DELAYED RELEASE ORAL
COMMUNITY

## 2024-03-21 RX ORDER — NAPROXEN SODIUM 220 MG
TABLET ORAL
COMMUNITY

## 2024-03-21 RX ORDER — BLOOD SUGAR DIAGNOSTIC
1 STRIP MISCELLANEOUS DAILY
Qty: 200 EACH | Refills: 3 | Status: SHIPPED | OUTPATIENT
Start: 2024-03-21

## 2024-03-21 RX ORDER — BENZONATATE 100 MG/1
CAPSULE ORAL
COMMUNITY
Start: 2024-03-14

## 2024-03-21 RX ORDER — GUAIFENESIN 200 MG/10ML
200 LIQUID ORAL EVERY 4 HOURS PRN
COMMUNITY
Start: 2024-03-14

## 2024-03-21 RX ORDER — BUDESONIDE AND FORMOTEROL FUMARATE DIHYDRATE 160; 4.5 UG/1; UG/1
2 AEROSOL RESPIRATORY (INHALATION) 2 TIMES DAILY
COMMUNITY
Start: 2021-05-24

## 2024-03-21 SDOH — ECONOMIC STABILITY: FOOD INSECURITY: WITHIN THE PAST 12 MONTHS, YOU WORRIED THAT YOUR FOOD WOULD RUN OUT BEFORE YOU GOT MONEY TO BUY MORE.: NEVER TRUE

## 2024-03-21 SDOH — ECONOMIC STABILITY: FOOD INSECURITY: WITHIN THE PAST 12 MONTHS, THE FOOD YOU BOUGHT JUST DIDN'T LAST AND YOU DIDN'T HAVE MONEY TO GET MORE.: NEVER TRUE

## 2024-03-21 SDOH — ECONOMIC STABILITY: INCOME INSECURITY: HOW HARD IS IT FOR YOU TO PAY FOR THE VERY BASICS LIKE FOOD, HOUSING, MEDICAL CARE, AND HEATING?: NOT HARD AT ALL

## 2024-03-21 NOTE — ASSESSMENT & PLAN NOTE
Improving, completed  augmentin and doxycycline  Repeat chest xray in 4 weeks  Referral to pulmonary for management, d/t recurrent pneumonia

## 2024-03-24 DIAGNOSIS — E78.5 HYPERLIPIDEMIA ASSOCIATED WITH TYPE 2 DIABETES MELLITUS (HCC): Primary | ICD-10-CM

## 2024-03-24 DIAGNOSIS — I10 ESSENTIAL HYPERTENSION: ICD-10-CM

## 2024-03-24 DIAGNOSIS — E11.69 HYPERLIPIDEMIA ASSOCIATED WITH TYPE 2 DIABETES MELLITUS (HCC): Primary | ICD-10-CM

## 2024-03-26 NOTE — TELEPHONE ENCOUNTER
Last seen 3/21/2024   Last labs 03/04/24  Last filled  10/02/23,12/26/23  Next appointment 4/19/2024     Last Assessment & Plan Note   Edited:Cheryl Puga NP-C3/8/2024 10:53 AM    BP at goal, <130/80, continue carvedilol 25 mg BID, hydralazine 100 mg BID, bumex 1 mg BID   Last Assessment & Plan Note   Written:Cheryl Puga NP-C3/7/2024 3:25 PM    At goal, LDL goal <70  Continue rosuvastatin 20 mg daily  Recheck lipid panel in 6 months     Lab Results   Component Value Date     03/04/2024    K 5.1 03/04/2024     03/04/2024    CO2 30 03/04/2024    BUN 35 (H) 03/04/2024    CREATININE 2.03 (H) 03/04/2024    GLUCOSE 135 (H) 03/04/2024    CALCIUM 9.2 03/04/2024    PROT 7.0 03/04/2024    LABALBU 3.5 03/04/2024    BILITOT 0.3 03/04/2024    ALKPHOS 58 03/04/2024    AST 16 03/04/2024    ALT 20 03/04/2024    LABGLOM 26 (L) 03/04/2024    AGRATIO 1.0 03/04/2024    GLOB 3.5 03/04/2024

## 2024-03-27 ENCOUNTER — TELEPHONE (OUTPATIENT)
Facility: CLINIC | Age: 69
End: 2024-03-27

## 2024-03-27 DIAGNOSIS — I10 ESSENTIAL HYPERTENSION: ICD-10-CM

## 2024-03-27 DIAGNOSIS — E11.65 TYPE 2 DIABETES MELLITUS WITH HYPERGLYCEMIA, WITHOUT LONG-TERM CURRENT USE OF INSULIN (HCC): Primary | ICD-10-CM

## 2024-03-27 RX ORDER — ASPIRIN 81 MG/1
81 TABLET, COATED ORAL DAILY
Qty: 90 TABLET | Refills: 1 | Status: SHIPPED | OUTPATIENT
Start: 2024-03-27

## 2024-03-27 RX ORDER — CARVEDILOL 25 MG/1
25 TABLET ORAL 2 TIMES DAILY
Qty: 180 TABLET | Refills: 0 | Status: SHIPPED | OUTPATIENT
Start: 2024-03-27

## 2024-03-27 RX ORDER — ROSUVASTATIN CALCIUM 20 MG/1
20 TABLET, COATED ORAL DAILY
Qty: 90 TABLET | Refills: 1 | Status: SHIPPED | OUTPATIENT
Start: 2024-03-27

## 2024-03-27 NOTE — TELEPHONE ENCOUNTER
[facility] from [facility] called to let us know that pt is Caoda[place] and she is respecting Ramadan and she does not want to be seen until the middle of April

## 2024-03-27 NOTE — TELEPHONE ENCOUNTER
Patient  is participating in Ramadan currently and is going to hold off on PT until  mid April . We will likely have to put in another order for this then      Pt also will likely need labs due to fasting and possible non compliance with medication regimen . Per sig for test strips pt is checking blood sugar PRN should she be at certain times until Ramadan is over ?  Last A1C was 9.2 3/12/24 .     Left a message for patient family to let them know there will be labs .  Pending for patient for 4/19/24 appointment .

## 2024-04-17 ENCOUNTER — HOSPITAL ENCOUNTER (OUTPATIENT)
Facility: HOSPITAL | Age: 69
Discharge: HOME | End: 2024-04-20
Payer: MEDICARE

## 2024-04-17 DIAGNOSIS — J18.9 COMMUNITY ACQUIRED PNEUMONIA OF RIGHT LOWER LOBE OF LUNG: ICD-10-CM

## 2024-04-17 PROCEDURE — 71046 X-RAY EXAM CHEST 2 VIEWS: CPT

## 2024-04-18 ASSESSMENT — ENCOUNTER SYMPTOMS: SHORTNESS OF BREATH: 0

## 2024-04-19 ENCOUNTER — OFFICE VISIT (OUTPATIENT)
Facility: CLINIC | Age: 69
End: 2024-04-19
Payer: MEDICARE

## 2024-04-19 VITALS
HEIGHT: 60 IN | OXYGEN SATURATION: 100 % | WEIGHT: 213 LBS | TEMPERATURE: 97.6 F | RESPIRATION RATE: 16 BRPM | DIASTOLIC BLOOD PRESSURE: 76 MMHG | SYSTOLIC BLOOD PRESSURE: 126 MMHG | BODY MASS INDEX: 41.82 KG/M2 | HEART RATE: 69 BPM

## 2024-04-19 DIAGNOSIS — Z71.2 ENCOUNTER TO DISCUSS TEST RESULTS: ICD-10-CM

## 2024-04-19 DIAGNOSIS — E11.65 TYPE 2 DIABETES MELLITUS WITH HYPERGLYCEMIA, WITHOUT LONG-TERM CURRENT USE OF INSULIN (HCC): ICD-10-CM

## 2024-04-19 DIAGNOSIS — I10 ESSENTIAL HYPERTENSION: Primary | ICD-10-CM

## 2024-04-19 PROBLEM — I25.2 HISTORY OF MI (MYOCARDIAL INFARCTION): Status: RESOLVED | Noted: 2022-12-15 | Resolved: 2024-04-19

## 2024-04-19 LAB — HBA1C MFR BLD: 10.1 %

## 2024-04-19 PROCEDURE — 1123F ACP DISCUSS/DSCN MKR DOCD: CPT

## 2024-04-19 PROCEDURE — 3046F HEMOGLOBIN A1C LEVEL >9.0%: CPT

## 2024-04-19 PROCEDURE — 3074F SYST BP LT 130 MM HG: CPT

## 2024-04-19 PROCEDURE — 3078F DIAST BP <80 MM HG: CPT

## 2024-04-19 PROCEDURE — 83036 HEMOGLOBIN GLYCOSYLATED A1C: CPT

## 2024-04-19 PROCEDURE — 99214 OFFICE O/P EST MOD 30 MIN: CPT

## 2024-04-19 RX ORDER — INSULIN GLARGINE 100 [IU]/ML
46 INJECTION, SOLUTION SUBCUTANEOUS EVERY MORNING
Qty: 15 ML | Refills: 3 | Status: SHIPPED | OUTPATIENT
Start: 2024-04-19

## 2024-04-19 RX ORDER — BLOOD SUGAR DIAGNOSTIC
1 STRIP MISCELLANEOUS DAILY
Qty: 200 EACH | Refills: 3 | Status: SHIPPED | OUTPATIENT
Start: 2024-04-19

## 2024-04-19 SDOH — ECONOMIC STABILITY: FOOD INSECURITY: WITHIN THE PAST 12 MONTHS, THE FOOD YOU BOUGHT JUST DIDN'T LAST AND YOU DIDN'T HAVE MONEY TO GET MORE.: PATIENT DECLINED

## 2024-04-19 SDOH — ECONOMIC STABILITY: INCOME INSECURITY: HOW HARD IS IT FOR YOU TO PAY FOR THE VERY BASICS LIKE FOOD, HOUSING, MEDICAL CARE, AND HEATING?: PATIENT DECLINED

## 2024-04-19 SDOH — ECONOMIC STABILITY: HOUSING INSECURITY
IN THE LAST 12 MONTHS, WAS THERE A TIME WHEN YOU DID NOT HAVE A STEADY PLACE TO SLEEP OR SLEPT IN A SHELTER (INCLUDING NOW)?: PATIENT DECLINED

## 2024-04-19 SDOH — ECONOMIC STABILITY: FOOD INSECURITY: WITHIN THE PAST 12 MONTHS, YOU WORRIED THAT YOUR FOOD WOULD RUN OUT BEFORE YOU GOT MONEY TO BUY MORE.: PATIENT DECLINED

## 2024-04-19 NOTE — ASSESSMENT & PLAN NOTE
Worsening  Advised pt on lifestyle modifications, including minimizing carb intake  Continue lantus, increase ozempic to 1 mg weekly (advised to start this next week, as pt injected 0.5 mg today)  Advised pt to follow up with pharmacy for assistance with management   Recheck A1c in 6 weeks

## 2024-04-19 NOTE — PROGRESS NOTES
Soniamray Rivas presents today for   Chief Complaint   Patient presents with    Hypertension    Diabetes       Is someone accompanying this pt? Granddaughter    Is the patient using any DME equipment during OV? no    Depression Screening:      3/8/2024    10:17 AM 2/8/2024    11:21 AM 7/10/2023     3:19 PM 6/8/2023    11:20 AM 5/8/2023     4:17 PM 3/3/2023    11:35 AM 2/3/2023     3:39 PM   PHQ-9 Questionaire   Little interest or pleasure in doing things 0 0 0 0 0 1 0   Feeling down, depressed, or hopeless 0 0 0 0 0 0 0   PHQ-9 Total Score 0 0 0 0 0 1 0        LIOR 7-Anxiety        No data to display                   Learning Assessment:  No question data found.     Fall Risk       No data to display                   Travel Screening:    Travel Screening     No screening recorded since 04/18/24 0000       Travel History   Travel since 03/19/24    No documented travel since 03/19/24            Health Maintenance reviewed and discussed and ordered per Provider.  Transportation Needs: Unknown (4/19/2024)    PRAPARE - Transportation     Lack of Transportation (Medical): Not on file     Lack of Transportation (Non-Medical): Patient declined      Food Insecurity: Patient Declined (4/19/2024)    Hunger Vital Sign     Worried About Running Out of Food in the Last Year: Patient declined     Ran Out of Food in the Last Year: Patient declined     Financial Resource Strain: Patient Declined (4/19/2024)    Overall Financial Resource Strain (CARDIA)     Difficulty of Paying Living Expenses: Patient declined     Housing Stability: Unknown (4/19/2024)    Housing Stability Vital Sign     Unable to Pay for Housing in the Last Year: Not on file     Number of Places Lived in the Last Year: Not on file     Unstable Housing in the Last Year: Patient declined       Did you provide resources if patient requested them? no      Health Maintenance Due   Topic Date Due    COVID-19 Vaccine (1) Never done    Diabetic foot exam  Never done    
BID, bumex 1 mg BID    Type 2 DM, A1c 10.1  Home blood sugar readings: 70's to 80's, this morning was 400, states she feels very tired  Hypoglycemia: none  On statin: rosuvastatin 20 mg nightly  Comorbid: HLD, HTN  Followed by endocrine: none  Current treatment: Semaglutide 0.5 mg every 7 days (took this today), Insulin glargine 46 units in the morning  States she needs refill of her insulin, last took insulin yesterday  Diet: states she does not eat white bread or rice, does not drink juice, drinks carrot juice without sugar  Breakfast: peanut butter, egg white, onion, pepper, bread  Lunch: wheat cooked with broth of meat/beef, eats with brown bread, does not eat meat  States the swelling in her legs, is doing more walks    Health Maintenance  COVID vaccine- due for booster  Diabetic retinal exam- advised pt to schedule appointment with ophthalmology. Contact information provided.  Colorectal cancer screening- advised pt to schedule appointment for colonoscopy. Contact information provided.   Shingles vaccine- recommended  RSV vaccine- recommended  AWV - will schedule at next visit    Review of Systems   Respiratory:  Negative for shortness of breath.    Cardiovascular:  Negative for chest pain and leg swelling.   Neurological:  Negative for dizziness, light-headedness and headaches.     Objective:     Vitals:    04/19/24 1349   BP: 126/76   Site: Left Upper Arm   Pulse: 69   Resp: 16   Temp: 97.6 °F (36.4 °C)   TempSrc: Oral   SpO2: 100%   Weight: 96.6 kg (213 lb)   Height: 1.524 m (5')     Physical Exam  Vitals and nursing note reviewed.   Constitutional:       General: She is not in acute distress.     Appearance: She is not ill-appearing.   HENT:      Head: Normocephalic and atraumatic.   Cardiovascular:      Rate and Rhythm: Normal rate and regular rhythm.   Pulmonary:      Effort: Pulmonary effort is normal. No respiratory distress.      Breath sounds: No wheezing, rhonchi or rales.   Musculoskeletal:

## 2024-04-30 DIAGNOSIS — Z12.11 SCREEN FOR COLON CANCER: Primary | ICD-10-CM

## 2024-05-02 PROBLEM — E11.3293 MILD NONPROLIFERATIVE DIABETIC RETINOPATHY OF BOTH EYES WITHOUT MACULAR EDEMA ASSOCIATED WITH TYPE 2 DIABETES MELLITUS (HCC): Status: ACTIVE | Noted: 2024-05-02

## 2024-05-02 PROBLEM — Z79.4 TYPE 2 DIABETES MELLITUS WITH RETINOPATHY, WITH LONG-TERM CURRENT USE OF INSULIN (HCC): Status: ACTIVE | Noted: 2024-05-02

## 2024-05-02 PROBLEM — E11.3293 MILD NONPROLIFERATIVE DIABETIC RETINOPATHY OF BOTH EYES ASSOCIATED WITH TYPE 2 DIABETES MELLITUS (HCC): Status: RESOLVED | Noted: 2023-05-09 | Resolved: 2024-05-02

## 2024-05-02 PROBLEM — E11.319 TYPE 2 DIABETES MELLITUS WITH RETINOPATHY, WITH LONG-TERM CURRENT USE OF INSULIN (HCC): Status: ACTIVE | Noted: 2024-05-02

## 2024-05-06 ENCOUNTER — TELEPHONE (OUTPATIENT)
Facility: CLINIC | Age: 69
End: 2024-05-06

## 2024-05-06 NOTE — TELEPHONE ENCOUNTER
Patient called stating that her referral for GI and the office she was referred to do not accept her insurance and want to see if we could refer her to a different office who accepts her insurance. Patient is requesting a call back in regards to referral for new doctor and office location.

## 2024-05-08 NOTE — TELEPHONE ENCOUNTER
Daughter called and advised to call insurance for list of GI doctors who take pt's insurance so I can sen the new GI referral. Daughter understood.

## 2024-05-13 ENCOUNTER — TELEPHONE (OUTPATIENT)
Facility: CLINIC | Age: 69
End: 2024-05-13

## 2024-05-13 ENCOUNTER — TELEPHONE (OUTPATIENT)
Age: 69
End: 2024-05-13

## 2024-05-13 NOTE — TELEPHONE ENCOUNTER
Pharmacy Progress Note - Telephone Call    Ms. Joie Rivas 68 y.o. was contacted via an outbound telephone call today to reschedule their missed appointment for PharmD diabetes management and education per referral from Cheryl Puga NP-C.    Please call pt to reschedule.  Please close encounter after scheduled.  If unable to reach pt after 3 documented attempts, please close encounter.    Thank you,    Noel Mari, PharmD, BCACP, BC-ADM      For Pharmacy Admin Tracking Only    Program: Medical Group  CPA in place:  Yes  Time Spent (min): 5

## 2024-06-06 ENCOUNTER — HOSPITAL ENCOUNTER (OUTPATIENT)
Facility: HOSPITAL | Age: 69
Discharge: HOME | End: 2024-06-06
Payer: MEDICARE

## 2024-06-06 LAB
ALBUMIN SERPL-MCNC: 3.5 G/DL (ref 3.4–5)
ALBUMIN/GLOB SERPL: 1 (ref 0.8–1.7)
ALP SERPL-CCNC: 68 U/L (ref 45–117)
ALT SERPL-CCNC: 25 U/L (ref 13–56)
ANION GAP SERPL CALC-SCNC: 8 MMOL/L (ref 3–18)
AST SERPL-CCNC: 20 U/L (ref 10–38)
BASOPHILS # BLD: 0 K/UL (ref 0–0.1)
BASOPHILS NFR BLD: 1 % (ref 0–2)
BILIRUB SERPL-MCNC: 0.3 MG/DL (ref 0.2–1)
BUN SERPL-MCNC: 40 MG/DL (ref 7–18)
BUN/CREAT SERPL: 19 (ref 12–20)
CALCIUM SERPL-MCNC: 9.2 MG/DL (ref 8.5–10.1)
CHLORIDE SERPL-SCNC: 102 MMOL/L (ref 100–111)
CO2 SERPL-SCNC: 28 MMOL/L (ref 21–32)
CREAT SERPL-MCNC: 2.08 MG/DL (ref 0.6–1.3)
DIFFERENTIAL METHOD BLD: ABNORMAL
EOSINOPHIL # BLD: 0.3 K/UL (ref 0–0.4)
EOSINOPHIL NFR BLD: 6 % (ref 0–5)
ERYTHROCYTE [DISTWIDTH] IN BLOOD BY AUTOMATED COUNT: 13 % (ref 11.6–14.5)
EST. AVERAGE GLUCOSE BLD GHB EST-MCNC: 217 MG/DL
GLOBULIN SER CALC-MCNC: 3.5 G/DL (ref 2–4)
GLUCOSE SERPL-MCNC: 156 MG/DL (ref 74–99)
HBA1C MFR BLD: 9.2 % (ref 4.2–5.6)
HCT VFR BLD AUTO: 35 % (ref 35–45)
HGB BLD-MCNC: 10.7 G/DL (ref 12–16)
IMM GRANULOCYTES # BLD AUTO: 0 K/UL (ref 0–0.04)
IMM GRANULOCYTES NFR BLD AUTO: 0 % (ref 0–0.5)
LYMPHOCYTES # BLD: 1.9 K/UL (ref 0.9–3.6)
LYMPHOCYTES NFR BLD: 40 % (ref 21–52)
MCH RBC QN AUTO: 24.2 PG (ref 24–34)
MCHC RBC AUTO-ENTMCNC: 30.6 G/DL (ref 31–37)
MCV RBC AUTO: 79.2 FL (ref 78–100)
MONOCYTES # BLD: 0.4 K/UL (ref 0.05–1.2)
MONOCYTES NFR BLD: 9 % (ref 3–10)
NEUTS SEG # BLD: 2.1 K/UL (ref 1.8–8)
NEUTS SEG NFR BLD: 44 % (ref 40–73)
NRBC # BLD: 0 K/UL (ref 0–0.01)
NRBC BLD-RTO: 0 PER 100 WBC
PLATELET # BLD AUTO: 239 K/UL (ref 135–420)
PMV BLD AUTO: 11.4 FL (ref 9.2–11.8)
POTASSIUM SERPL-SCNC: 4.9 MMOL/L (ref 3.5–5.5)
PROT SERPL-MCNC: 7 G/DL (ref 6.4–8.2)
RBC # BLD AUTO: 4.42 M/UL (ref 4.2–5.3)
SODIUM SERPL-SCNC: 138 MMOL/L (ref 136–145)
WBC # BLD AUTO: 4.8 K/UL (ref 4.6–13.2)

## 2024-06-06 PROCEDURE — 85025 COMPLETE CBC W/AUTO DIFF WBC: CPT

## 2024-06-06 PROCEDURE — 83036 HEMOGLOBIN GLYCOSYLATED A1C: CPT

## 2024-06-06 PROCEDURE — 80053 COMPREHEN METABOLIC PANEL: CPT

## 2024-06-06 PROCEDURE — 36415 COLL VENOUS BLD VENIPUNCTURE: CPT

## 2024-06-09 PROBLEM — D72.819 LEUKOPENIA: Status: RESOLVED | Noted: 2023-03-02 | Resolved: 2024-06-09

## 2024-06-09 NOTE — PROGRESS NOTES
Medicare Annual Wellness Visit    Joie Rivas is here for Medicare AWV and Constipation (ONSET- 2 month ago last bowel was two days ago /LOCATION- buttock(s)  /DURATION- constant/CHARACTERISTICS: no pain, straining to have bowel movement /ASSOCIATED SYMPTOMS: hard stool, dark in color /AGGRAVATING FACTORS: none  /RELIEVING FACTORS: none  /TREATMENT: miralax )    Assessment & Plan   Medicare annual wellness visit, subsequent  ACP (advance care planning)  Screen for colon cancer  -     Cologuard (Fecal DNA Colorectal Cancer Screening)  Vaccine counseling  Comments:  Due for Shingles vaccine, COVID vaccine, RSV vaccine.     Recommendations for Preventive Services Due: see orders and patient instructions/AVS.    Recommended screening schedule for the next 5-10 years is provided to the patient in written form: see Patient Instructions/AVS.     Return in about 6 weeks (around 7/23/2024) for constipation.     Subjective     Patient's complete Health Risk Assessment and screening values have been reviewed and are found in Flowsheets. The following problems were reviewed today and where indicated follow up appointments were made and/or referrals ordered.    Positive Risk Factor Screenings with Interventions:    Fall Risk:  Do you feel unsteady or are you worried about falling? : (!) yes  2 or more falls in past year?: no  Fall with injury in past year?: no     Interventions:    Reviewed medications, home hazards, visual acuity, and co-morbidities that can increase risk for falls  See AVS for additional education material             Activity, Diet, and Weight:  On average, how many days per week do you engage in moderate to strenuous exercise (like a brisk walk)?: 7 days  On average, how many minutes do you engage in exercise at this level?: 30 min    Do you eat balanced/healthy meals regularly?: Yes    Body mass index is 41.99 kg/m². (!) Abnormal    Obesity Interventions:  See AVS for additional education material

## 2024-06-09 NOTE — PROGRESS NOTES
Chief Complaint   Patient presents with    Medicare AWV    Constipation     ONSET- 2 month ago last bowel was two days ago   LOCATION- buttock(s)    DURATION- constant  CHARACTERISTICS: no pain, straining to have bowel movement   ASSOCIATED SYMPTOMS: hard stool, dark in color   AGGRAVATING FACTORS: none    RELIEVING FACTORS: none    TREATMENT: miralax      Assessment & Plan:     1. Essential hypertension  Assessment & Plan:  If elevated, advise to follow up with nephrology for management  Orders:  -     Comprehensive Metabolic Panel; Future  -     Lipid Panel; Future  2. Type 2 diabetes mellitus with hyperglycemia, without long-term current use of insulin (HCC)  Assessment & Plan:  A1c improving  Continue lantus, ozempic 1 mg weekly  Advised pt to follow up with pharmacy for assistance with management  Orders:  -     blood glucose test strips (RELION TRUE METRIX TEST STRIPS) strip; 1 each by In Vitro route daily As needed., Disp-200 each, R-3Normal  -     Blood Glucose Monitoring Suppl (TRUE METRIX AIR GLUCOSE METER) w/Device KIT; 1 each by Does not apply route in the morning, at noon, in the evening, and at bedtime, Disp-1 kit, R-0Normal  -     Comprehensive Metabolic Panel; Future  -     Hemoglobin A1C; Future  3. Stage 3a chronic kidney disease (HCC)  Assessment & Plan:  Worsening  Continue management per nephrology  4. Other constipation  Assessment & Plan:  Continue daily miralax  Start daily colace, and PRN dulxolax suppository until patient has daily bowel movements  Start daily fiber supplement  If no improvement, referral to GI   Orders:  -     docusate sodium (COLACE) 100 MG capsule; Take 1 capsule by mouth daily as needed for Constipation, Disp-30 capsule, R-0Normal  5. Encounter to discuss test results  6. Leukopenia, unspecified type  Assessment & Plan:  REsolved   7. Screen for colon cancer  -     Cologuard (Fecal DNA Colorectal Cancer Screening)  8. Vaccine counseling  Comments:  Due for Shingles

## 2024-06-09 NOTE — ASSESSMENT & PLAN NOTE
A1c improving  Continue lantus, ozempic 1 mg weekly  Advised pt to follow up with pharmacy for assistance with management

## 2024-06-09 NOTE — PROGRESS NOTES
Advance Care Planning     Advance Care Planning (ACP) Physician/NP/PA Conversation    Date of Conversation: 6/11/2024  Conducted with: Patient with Decision Making Capacity  Other persons present: Son      Healthcare Decision Maker: No healthcare decision makers have been documented.  Click here to complete HealthCare Decision Makers including selection of the Healthcare Decision Maker Relationship (ie \"Primary\")   Today we documented Decision Maker(s) consistent with Legal Next of Kin hierarchy.    Care Preferences:    Hospitalization:  \"If your health worsens and it becomes clear that your chance of recovery is unlikely, what would be your preference regarding hospitalization?\"  The patient would prefer hospitalization.    Ventilation:  \"If you were unable to breath on your own and your chance of recovery was unlikely, what would be your preference about the use of a ventilator (breathing machine) if it was available to you?\"  The patient is unsure.    Resuscitation:  \"In the event your heart stopped as a result of an underlying serious health condition, would you want attempts made to restart your heart, or would you prefer a natural death?\"  Yes, attempt to resuscitate.    Conversation Outcomes / Follow-Up Plan:  ACP in process - information provided, considering goals and options    Length of Voluntary ACP Conversation in minutes:  <16 minutes (Non-Billable)    ANNIKA Berry

## 2024-06-11 ENCOUNTER — OFFICE VISIT (OUTPATIENT)
Facility: CLINIC | Age: 69
End: 2024-06-11
Payer: MEDICARE

## 2024-06-11 VITALS
WEIGHT: 215 LBS | HEART RATE: 96 BPM | TEMPERATURE: 97.1 F | SYSTOLIC BLOOD PRESSURE: 114 MMHG | OXYGEN SATURATION: 96 % | HEIGHT: 60 IN | BODY MASS INDEX: 42.21 KG/M2 | RESPIRATION RATE: 16 BRPM | DIASTOLIC BLOOD PRESSURE: 71 MMHG

## 2024-06-11 DIAGNOSIS — I10 ESSENTIAL HYPERTENSION: ICD-10-CM

## 2024-06-11 DIAGNOSIS — Z12.11 SCREEN FOR COLON CANCER: ICD-10-CM

## 2024-06-11 DIAGNOSIS — Z71.2 ENCOUNTER TO DISCUSS TEST RESULTS: ICD-10-CM

## 2024-06-11 DIAGNOSIS — E11.65 TYPE 2 DIABETES MELLITUS WITH HYPERGLYCEMIA, WITHOUT LONG-TERM CURRENT USE OF INSULIN (HCC): ICD-10-CM

## 2024-06-11 DIAGNOSIS — Z71.85 VACCINE COUNSELING: ICD-10-CM

## 2024-06-11 DIAGNOSIS — D72.819 LEUKOPENIA, UNSPECIFIED TYPE: ICD-10-CM

## 2024-06-11 DIAGNOSIS — N18.31 STAGE 3A CHRONIC KIDNEY DISEASE (HCC): ICD-10-CM

## 2024-06-11 DIAGNOSIS — Z00.00 MEDICARE ANNUAL WELLNESS VISIT, SUBSEQUENT: Primary | ICD-10-CM

## 2024-06-11 DIAGNOSIS — K59.09 OTHER CONSTIPATION: ICD-10-CM

## 2024-06-11 DIAGNOSIS — Z71.89 ACP (ADVANCE CARE PLANNING): ICD-10-CM

## 2024-06-11 PROCEDURE — 3078F DIAST BP <80 MM HG: CPT

## 2024-06-11 PROCEDURE — 1123F ACP DISCUSS/DSCN MKR DOCD: CPT

## 2024-06-11 PROCEDURE — 3046F HEMOGLOBIN A1C LEVEL >9.0%: CPT

## 2024-06-11 PROCEDURE — G0439 PPPS, SUBSEQ VISIT: HCPCS

## 2024-06-11 PROCEDURE — 99215 OFFICE O/P EST HI 40 MIN: CPT

## 2024-06-11 PROCEDURE — 3074F SYST BP LT 130 MM HG: CPT

## 2024-06-11 RX ORDER — BLOOD-GLUCOSE METER
EACH MISCELLANEOUS
COMMUNITY

## 2024-06-11 RX ORDER — DIPHENHYDRAMINE HCL 25 MG
TABLET ORAL
COMMUNITY
End: 2024-06-11 | Stop reason: SDUPTHER

## 2024-06-11 RX ORDER — DOCUSATE SODIUM 100 MG/1
100 CAPSULE, LIQUID FILLED ORAL DAILY PRN
Qty: 30 CAPSULE | Refills: 0 | Status: SHIPPED | OUTPATIENT
Start: 2024-06-11 | End: 2024-06-14 | Stop reason: SDUPTHER

## 2024-06-11 RX ORDER — BLOOD SUGAR DIAGNOSTIC
1 STRIP MISCELLANEOUS DAILY
Qty: 200 EACH | Refills: 3 | Status: SHIPPED | OUTPATIENT
Start: 2024-06-11

## 2024-06-11 RX ORDER — DIPHENHYDRAMINE HCL 25 MG
1 TABLET ORAL 4 TIMES DAILY
Qty: 1 KIT | Refills: 0 | Status: SHIPPED | OUTPATIENT
Start: 2024-06-11

## 2024-06-11 ASSESSMENT — PATIENT HEALTH QUESTIONNAIRE - PHQ9
1. LITTLE INTEREST OR PLEASURE IN DOING THINGS: NOT AT ALL
SUM OF ALL RESPONSES TO PHQ QUESTIONS 1-9: 0
2. FEELING DOWN, DEPRESSED OR HOPELESS: NOT AT ALL
SUM OF ALL RESPONSES TO PHQ QUESTIONS 1-9: 0
SUM OF ALL RESPONSES TO PHQ9 QUESTIONS 1 & 2: 0
SUM OF ALL RESPONSES TO PHQ QUESTIONS 1-9: 0
SUM OF ALL RESPONSES TO PHQ QUESTIONS 1-9: 0

## 2024-06-11 ASSESSMENT — LIFESTYLE VARIABLES
HOW MANY STANDARD DRINKS CONTAINING ALCOHOL DO YOU HAVE ON A TYPICAL DAY: PATIENT DOES NOT DRINK
HOW OFTEN DO YOU HAVE A DRINK CONTAINING ALCOHOL: NEVER

## 2024-06-11 NOTE — PROGRESS NOTES
Teodoradebbi Rivas presents today for   Chief Complaint   Patient presents with    Medicare AWV    Constipation     ONSET- 2 month ago last bowel was two days ago   LOCATION- buttock(s)    DURATION- constant  CHARACTERISTICS: no pain, straining to have bowel movement   ASSOCIATED SYMPTOMS: hard stool, dark in color   AGGRAVATING FACTORS: none    RELIEVING FACTORS: none    TREATMENT: miralax        Is someone accompanying this pt? Son     Is the patient using any DME equipment during OV? no    Depression Screenin/11/2024     2:10 PM 3/8/2024    10:17 AM 2024    11:21 AM 7/10/2023     3:19 PM 2023    11:20 AM 2023     4:17 PM 3/3/2023    11:35 AM   PHQ-9 Questionaire   Little interest or pleasure in doing things 0 0 0 0 0 0 1   Feeling down, depressed, or hopeless 0 0 0 0 0 0 0   PHQ-9 Total Score 0 0 0 0 0 0 1        LIOR 7-Anxiety        No data to display                   Learning Assessment:  No question data found.     Fall Risk       No data to display                   Travel Screening:    Travel Screening     No screening recorded since 06/10/24 0000       Travel History   Travel since 24    No documented travel since 24            Health Maintenance reviewed and discussed and ordered per Provider.  Transportation Needs: Unknown (2024)    PRAPARE - Transportation     Lack of Transportation (Medical): Not on file     Lack of Transportation (Non-Medical): Patient declined      Food Insecurity: Patient Declined (2024)    Hunger Vital Sign     Worried About Running Out of Food in the Last Year: Patient declined     Ran Out of Food in the Last Year: Patient declined     Financial Resource Strain: Patient Declined (2024)    Overall Financial Resource Strain (CARDIA)     Difficulty of Paying Living Expenses: Patient declined     Housing Stability: Unknown (2024)    Housing Stability Vital Sign     Unable to Pay for Housing in the Last Year: Not on file     Number of

## 2024-06-11 NOTE — PATIENT INSTRUCTIONS
other activities. Bit by bit, increase the time you're active every day. Try for at least 30 minutes on most days of the week.     Try to quit or cut back on using tobacco and other nicotine products. This includes smoking and vaping. If you need help quitting, talk to your doctor about stop-smoking programs and medicines. These can increase your chances of quitting for good. Quitting is one of the most important things you can do to protect your heart. It is never too late to quit. Try to avoid secondhand smoke too.     Stay at a weight that's healthy for you. Talk to your doctor if you need help losing weight.     Try to get 7 to 9 hours of sleep each night.     Limit alcohol to 2 drinks a day for men and 1 drink a day for women. Too much alcohol can cause health problems.     Manage other health problems such as diabetes, high blood pressure, and high cholesterol. If you think you may have a problem with alcohol or drug use, talk to your doctor.   Medicines    Take your medicines exactly as prescribed. Call your doctor if you think you are having a problem with your medicine.     If your doctor recommends aspirin, take the amount directed each day. Make sure you take aspirin and not another kind of pain reliever, such as acetaminophen (Tylenol).   When should you call for help?   Call 911 if you have symptoms of a heart attack. These may include:    Chest pain or pressure, or a strange feeling in the chest.     Sweating.     Shortness of breath.     Pain, pressure, or a strange feeling in the back, neck, jaw, or upper belly or in one or both shoulders or arms.     Lightheadedness or sudden weakness.     A fast or irregular heartbeat.   After you call 911, the  may tell you to chew 1 adult-strength or 2 to 4 low-dose aspirin. Wait for an ambulance. Do not try to drive yourself.  Watch closely for changes in your health, and be sure to contact your doctor if you have any problems.  Where can you learn

## 2024-06-12 PROBLEM — K59.09 OTHER CONSTIPATION: Status: ACTIVE | Noted: 2024-06-12

## 2024-06-12 ASSESSMENT — ENCOUNTER SYMPTOMS
NAUSEA: 0
ABDOMINAL PAIN: 0
CONSTIPATION: 1
VOMITING: 0

## 2024-06-12 NOTE — ASSESSMENT & PLAN NOTE
Continue daily miralax  Start daily colace, and PRN dulxolax suppository until patient has daily bowel movements  Start daily fiber supplement  If no improvement, referral to GI

## 2024-06-21 ENCOUNTER — HOSPITAL ENCOUNTER (OUTPATIENT)
Facility: HOSPITAL | Age: 69
End: 2024-06-21
Payer: MEDICARE

## 2024-06-21 LAB
ANION GAP SERPL CALC-SCNC: 7 MMOL/L (ref 3–18)
BUN SERPL-MCNC: 42 MG/DL (ref 7–18)
BUN/CREAT SERPL: 22 (ref 12–20)
CALCIUM SERPL-MCNC: 8.8 MG/DL (ref 8.5–10.1)
CHLORIDE SERPL-SCNC: 103 MMOL/L (ref 100–111)
CO2 SERPL-SCNC: 27 MMOL/L (ref 21–32)
CREAT SERPL-MCNC: 1.9 MG/DL (ref 0.6–1.3)
GLUCOSE SERPL-MCNC: 271 MG/DL (ref 74–99)
NT PRO BNP: 358 PG/ML (ref 0–900)
POTASSIUM SERPL-SCNC: 4.6 MMOL/L (ref 3.5–5.5)
SODIUM SERPL-SCNC: 137 MMOL/L (ref 136–145)

## 2024-06-21 PROCEDURE — 80048 BASIC METABOLIC PNL TOTAL CA: CPT

## 2024-06-21 PROCEDURE — 36415 COLL VENOUS BLD VENIPUNCTURE: CPT

## 2024-06-21 PROCEDURE — 83880 ASSAY OF NATRIURETIC PEPTIDE: CPT

## 2024-06-27 LAB — NONINV COLON CA DNA+OCC BLD SCRN STL QL: NORMAL

## 2024-07-08 ENCOUNTER — TELEPHONE (OUTPATIENT)
Facility: CLINIC | Age: 69
End: 2024-07-08

## 2024-07-08 NOTE — TELEPHONE ENCOUNTER
Surgery scheduler called in stating that she has been attempting to call the office for one month to get the patient scheduled for a pre op appointment. Advised her that there are no calls from her noted within the past month other than the call from Friday, 7/5. Cher was advised that the patient would need at least 2 weeks prior to her scheduled surgery date. Cher stated she would contact the patient and make her aware.

## 2024-07-08 NOTE — TELEPHONE ENCOUNTER
----- Message from Riri Fuchs sent at 7/5/2024  1:25 PM EDT -----  Regarding: ECC Appointment Request  ECC Appointment Request    Patient needs appointment for ECC Appointment Type: Pre-Op Visit.    Patient Requested Dates(s): Any date in the week July 8, 2024  Patient Requested Time: Anytime available  Provider Name: Cheryl Puga    Reason for Appointment Request: Established Patient - Available appointments did not meet patient need  --------------------------------------------------------------------------------------------------------------------------    Relationship to Patient: Covered Entity (Cher)     Call Back Information: OK to leave message on voicemail  Preferred Call Back Number: [phone]    Patient will be having a Cataract surgery Date: July 15, 2023.

## 2024-07-09 NOTE — PROGRESS NOTES
evening, and at bedtime    solifenacin (VESICARE) 10 MG tablet Take 1 tablet by mouth daily    Semaglutide, 1 MG/DOSE, 2 MG/1.5ML SOPN Inject 1 mg into the skin every 7 days    insulin glargine (BASAGLAR KWIKPEN) 100 UNIT/ML injection pen Inject 46 Units into the skin every morning (Patient taking differently: Inject 40 Units into the skin every morning)    Insulin Pen Needle 32G X 5 MM MISC 1 each by Does not apply route daily    ASPIRIN LOW DOSE 81 MG EC tablet take 1 tablet by mouth once daily    carvedilol (COREG) 25 MG tablet take 1 tablet by mouth twice a day    rosuvastatin (CRESTOR) 20 MG tablet take 1 tablet by mouth once daily    omeprazole (PRILOSEC) 40 MG delayed release capsule Take 1 capsule by mouth every morning (before breakfast) (Patient not taking: Reported on 5/16/2024)    RA SENNA 8.6 MG tablet take 1 tablet by mouth once daily (HOLD IF START HAVING DIARRHEA) (Patient not taking: Reported on 5/16/2024)    guaiFENesin (ROBITUSSIN) 100 MG/5ML liquid Take 10 mLs by mouth every 4 hours as needed for Cough (Patient not taking: Reported on 5/16/2024)    budesonide-formoterol (SYMBICORT) 160-4.5 MCG/ACT AERO Inhale 2 puffs into the lungs 2 times daily (Patient not taking: Reported on 5/16/2024)    amLODIPine (NORVASC) 10 MG tablet Take 1 tablet by mouth nightly at bedtime.    albuterol sulfate HFA (PROVENTIL;VENTOLIN;PROAIR) 108 (90 Base) MCG/ACT inhaler inhale 2 puffs by mouth and INTO THE LUNGS every 4 hours if needed for wheezing or shortness of breath    lisinopril (PRINIVIL;ZESTRIL) 40 MG tablet Take 1 tablet by mouth daily (Patient not taking: Reported on 5/16/2024)    bumetanide (BUMEX) 1 MG tablet 1 tablet 2 times daily    hydrALAZINE (APRESOLINE) 100 MG tablet 1 tablet 2 times daily    ferrous sulfate (FE TABS 325) 325 (65 Fe) MG EC tablet Take 1 tablet by mouth every morning (before breakfast) (Patient not taking: Reported on 5/16/2024)     No current facility-administered medications for

## 2024-07-10 ENCOUNTER — PHARMACY VISIT (OUTPATIENT)
Facility: CLINIC | Age: 69
End: 2024-07-10

## 2024-07-10 ENCOUNTER — TELEPHONE (OUTPATIENT)
Facility: CLINIC | Age: 69
End: 2024-07-10

## 2024-07-10 DIAGNOSIS — K59.09 OTHER CONSTIPATION: ICD-10-CM

## 2024-07-10 DIAGNOSIS — E11.65 TYPE 2 DIABETES MELLITUS WITH HYPERGLYCEMIA, WITHOUT LONG-TERM CURRENT USE OF INSULIN (HCC): ICD-10-CM

## 2024-07-10 DIAGNOSIS — Z79.4 TYPE 2 DIABETES MELLITUS WITH HYPERGLYCEMIA, WITH LONG-TERM CURRENT USE OF INSULIN (HCC): Primary | ICD-10-CM

## 2024-07-10 DIAGNOSIS — E11.65 TYPE 2 DIABETES MELLITUS WITH HYPERGLYCEMIA, WITH LONG-TERM CURRENT USE OF INSULIN (HCC): Primary | ICD-10-CM

## 2024-07-10 RX ORDER — DOCUSATE SODIUM 100 MG/1
100 CAPSULE, LIQUID FILLED ORAL DAILY PRN
Qty: 90 CAPSULE | Refills: 3 | Status: SHIPPED | OUTPATIENT
Start: 2024-07-10

## 2024-07-10 RX ORDER — SEMAGLUTIDE 2.68 MG/ML
2 INJECTION, SOLUTION SUBCUTANEOUS
Qty: 3 ML | Refills: 11 | Status: SHIPPED | OUTPATIENT
Start: 2024-07-10

## 2024-07-10 RX ORDER — INSULIN GLARGINE 100 [IU]/ML
46 INJECTION, SOLUTION SUBCUTANEOUS EVERY MORNING
Qty: 15 ML | Refills: 3 | Status: SHIPPED | OUTPATIENT
Start: 2024-07-10

## 2024-07-10 NOTE — TELEPHONE ENCOUNTER
----- Message from Duncan Shannon sent at 7/10/2024 10:51 AM EDT -----  Regarding: ECC Appointment Request  ECC Appointment Request    Patient needs appointment for ECC Appointment Type: Pre-Op Visit.    Patient Requested Dates(s): July 29,2024  Patient Requested Time:  Provider Name: Cheryl Puga NP-C  Reason for Appointment Request: Established Patient - No appointments available during search. Pt will be a having cataract surgery both eyes.  Pt needs an  for Yi    --------------------------------------------------------------------------------------------------------------------------    Relationship to Patient: Cher Tirado from Doctor office    Call Back Information: OK to leave message on voicemail  Preferred Call Back Number: Phone: [phone]

## 2024-07-15 ENCOUNTER — HOSPITAL ENCOUNTER (OUTPATIENT)
Facility: HOSPITAL | Age: 69
Discharge: HOME | End: 2024-07-18
Payer: COMMERCIAL

## 2024-07-15 LAB
25(OH)D3 SERPL-MCNC: 47 NG/ML (ref 30–100)
ALBUMIN SERPL-MCNC: 3.2 G/DL (ref 3.4–5)
ANION GAP SERPL CALC-SCNC: 1 MMOL/L (ref 3–18)
APPEARANCE UR: CLEAR
BACTERIA URNS QL MICRO: ABNORMAL /HPF
BILIRUB UR QL: NEGATIVE
BUN SERPL-MCNC: 28 MG/DL (ref 7–18)
BUN/CREAT SERPL: 16 (ref 12–20)
CALCIUM SERPL-MCNC: 8.9 MG/DL (ref 8.5–10.1)
CALCIUM SERPL-MCNC: 9 MG/DL (ref 8.5–10.1)
CHLORIDE SERPL-SCNC: 103 MMOL/L (ref 100–111)
CO2 SERPL-SCNC: 32 MMOL/L (ref 21–32)
COLOR UR: YELLOW
CREAT SERPL-MCNC: 1.71 MG/DL (ref 0.6–1.3)
CREAT UR-MCNC: 46 MG/DL (ref 30–125)
EPITH CASTS URNS QL MICRO: ABNORMAL /LPF (ref 0–5)
ERYTHROCYTE [DISTWIDTH] IN BLOOD BY AUTOMATED COUNT: 12.6 % (ref 11.6–14.5)
GLUCOSE SERPL-MCNC: 297 MG/DL (ref 74–99)
GLUCOSE UR STRIP.AUTO-MCNC: NEGATIVE MG/DL
HCT VFR BLD AUTO: 32.6 % (ref 35–45)
HGB BLD-MCNC: 10 G/DL (ref 12–16)
HGB UR QL STRIP: NEGATIVE
HYALINE CASTS URNS QL MICRO: ABNORMAL /LPF (ref 0–2)
KETONES UR QL STRIP.AUTO: NEGATIVE MG/DL
LEUKOCYTE ESTERASE UR QL STRIP.AUTO: ABNORMAL
MCH RBC QN AUTO: 24.7 PG (ref 24–34)
MCHC RBC AUTO-ENTMCNC: 30.7 G/DL (ref 31–37)
MCV RBC AUTO: 80.5 FL (ref 78–100)
MICROALBUMIN UR-MCNC: 9.52 MG/DL (ref 0–3)
MICROALBUMIN/CREAT UR-RTO: 207 MG/G (ref 0–30)
MUCOUS THREADS URNS QL MICRO: ABNORMAL /LPF
NITRITE UR QL STRIP.AUTO: NEGATIVE
NRBC # BLD: 0 K/UL (ref 0–0.01)
NRBC BLD-RTO: 0 PER 100 WBC
PH UR STRIP: 5.5 (ref 5–8)
PHOSPHATE SERPL-MCNC: 3.4 MG/DL (ref 2.5–4.9)
PLATELET # BLD AUTO: 193 K/UL (ref 135–420)
PMV BLD AUTO: 11.8 FL (ref 9.2–11.8)
POTASSIUM SERPL-SCNC: 5.3 MMOL/L (ref 3.5–5.5)
PROT UR STRIP-MCNC: NEGATIVE MG/DL
PTH-INTACT SERPL-MCNC: 154.3 PG/ML (ref 18.4–88)
RBC # BLD AUTO: 4.05 M/UL (ref 4.2–5.3)
RBC #/AREA URNS HPF: ABNORMAL /HPF (ref 0–5)
SODIUM SERPL-SCNC: 136 MMOL/L (ref 136–145)
SP GR UR REFRACTOMETRY: 1.01 (ref 1–1.03)
UROBILINOGEN UR QL STRIP.AUTO: 0.2 EU/DL (ref 0.2–1)
WBC # BLD AUTO: 3.8 K/UL (ref 4.6–13.2)
WBC URNS QL MICRO: ABNORMAL /HPF (ref 0–4)

## 2024-07-15 PROCEDURE — 36415 COLL VENOUS BLD VENIPUNCTURE: CPT

## 2024-07-15 PROCEDURE — 81001 URINALYSIS AUTO W/SCOPE: CPT

## 2024-07-15 PROCEDURE — 83970 ASSAY OF PARATHORMONE: CPT

## 2024-07-15 PROCEDURE — 82043 UR ALBUMIN QUANTITATIVE: CPT

## 2024-07-15 PROCEDURE — 82306 VITAMIN D 25 HYDROXY: CPT

## 2024-07-15 PROCEDURE — 82570 ASSAY OF URINE CREATININE: CPT

## 2024-07-15 PROCEDURE — 80069 RENAL FUNCTION PANEL: CPT

## 2024-07-15 PROCEDURE — 85027 COMPLETE CBC AUTOMATED: CPT

## 2024-07-17 LAB — NONINV COLON CA DNA+OCC BLD SCRN STL QL: NEGATIVE

## 2024-07-18 ENCOUNTER — TELEPHONE (OUTPATIENT)
Facility: CLINIC | Age: 69
End: 2024-07-18

## 2024-07-18 ENCOUNTER — OFFICE VISIT (OUTPATIENT)
Facility: CLINIC | Age: 69
End: 2024-07-18
Payer: COMMERCIAL

## 2024-07-18 VITALS
SYSTOLIC BLOOD PRESSURE: 119 MMHG | TEMPERATURE: 97.3 F | WEIGHT: 211 LBS | HEIGHT: 60 IN | DIASTOLIC BLOOD PRESSURE: 67 MMHG | HEART RATE: 100 BPM | BODY MASS INDEX: 41.43 KG/M2 | OXYGEN SATURATION: 97 % | RESPIRATION RATE: 16 BRPM

## 2024-07-18 DIAGNOSIS — Z01.818 PREOPERATIVE EXAMINATION: ICD-10-CM

## 2024-07-18 DIAGNOSIS — I50.32 CHRONIC HEART FAILURE WITH PRESERVED EJECTION FRACTION (HCC): ICD-10-CM

## 2024-07-18 DIAGNOSIS — E11.65 TYPE 2 DIABETES MELLITUS WITH HYPERGLYCEMIA, WITHOUT LONG-TERM CURRENT USE OF INSULIN (HCC): ICD-10-CM

## 2024-07-18 DIAGNOSIS — H25.013 CORTICAL AGE-RELATED CATARACT OF BOTH EYES: Primary | ICD-10-CM

## 2024-07-18 PROBLEM — H26.9 CATARACTS, BILATERAL: Status: ACTIVE | Noted: 2024-07-18

## 2024-07-18 PROCEDURE — 3046F HEMOGLOBIN A1C LEVEL >9.0%: CPT

## 2024-07-18 PROCEDURE — 3078F DIAST BP <80 MM HG: CPT

## 2024-07-18 PROCEDURE — 99215 OFFICE O/P EST HI 40 MIN: CPT

## 2024-07-18 PROCEDURE — 3074F SYST BP LT 130 MM HG: CPT

## 2024-07-18 PROCEDURE — 1123F ACP DISCUSS/DSCN MKR DOCD: CPT

## 2024-07-18 SDOH — ECONOMIC STABILITY: FOOD INSECURITY: WITHIN THE PAST 12 MONTHS, THE FOOD YOU BOUGHT JUST DIDN'T LAST AND YOU DIDN'T HAVE MONEY TO GET MORE.: PATIENT DECLINED

## 2024-07-18 SDOH — ECONOMIC STABILITY: FOOD INSECURITY: WITHIN THE PAST 12 MONTHS, YOU WORRIED THAT YOUR FOOD WOULD RUN OUT BEFORE YOU GOT MONEY TO BUY MORE.: PATIENT DECLINED

## 2024-07-18 SDOH — ECONOMIC STABILITY: INCOME INSECURITY: HOW HARD IS IT FOR YOU TO PAY FOR THE VERY BASICS LIKE FOOD, HOUSING, MEDICAL CARE, AND HEATING?: PATIENT DECLINED

## 2024-07-18 ASSESSMENT — ENCOUNTER SYMPTOMS
DIARRHEA: 0
SHORTNESS OF BREATH: 0
CHEST TIGHTNESS: 0
NAUSEA: 0
ABDOMINAL PAIN: 0
VOMITING: 0

## 2024-07-18 NOTE — ASSESSMENT & PLAN NOTE
Needs to re-evaluate A1c in 4 weeks  Recommend less than 8, prior to any invasive procedure  Continue current regimen, appreciate pharmacy's assistance with management

## 2024-07-18 NOTE — PROGRESS NOTES
Pt speaks the language, Divehi, the  services were used to conduct this visit.  number [id number].    Preoperative Evaluation    SUBJECTIVE:     Date of Exam: 7/18/2024     Joie Rivas is a 68 y.o. female 1955 who presents for preoperative evaluation.   Procedure/Surgery: Phaco with IOL  Date of Procedure/Surgery: 7/29/24  Surgeon: Artur Houser MD  Hospital/Surgical Facility: [facility]  Primary Physician: Cheryl Puga NP-C   Latex Allergy: No    Recent use of: aspirin 81 mg daily: yes.     Tetanus up to date: last tetanus booster within 10 years    Anesthesia Complications: No  History of abnormal bleeding : No  History of Blood Transfusions: No  Health Care Directive or Living Will: YES    Review of Systems   Constitutional:  Negative for chills and fever.   Respiratory:  Negative for chest tightness and shortness of breath.    Cardiovascular:  Negative for chest pain, palpitations and leg swelling.   Gastrointestinal:  Negative for abdominal pain, diarrhea, nausea and vomiting.   Genitourinary:  Negative for dysuria, frequency and urgency.   Skin:  Negative for rash.   Neurological:  Negative for dizziness, weakness and light-headedness.     OBJECTIVE:   /67 (Site: Left Upper Arm)   Pulse 100   Temp 97.3 °F (36.3 °C)   Resp 16   Ht 1.524 m (5')   Wt 95.7 kg (211 lb)   SpO2 97%   BMI 41.21 kg/m²     Physical Exam  Vitals and nursing note reviewed.   Constitutional:       Appearance: Normal appearance.   HENT:      Head: Normocephalic and atraumatic.      Mouth/Throat:      Mouth: Mucous membranes are moist.      Pharynx: Oropharynx is clear. No oropharyngeal exudate or posterior oropharyngeal erythema.   Eyes:      Extraocular Movements: Extraocular movements intact.      Conjunctiva/sclera: Conjunctivae normal.      Pupils: Pupils are equal, round, and reactive to light.   Cardiovascular:      Rate and Rhythm: Normal rate and regular rhythm.

## 2024-07-19 ENCOUNTER — TELEPHONE (OUTPATIENT)
Facility: CLINIC | Age: 69
End: 2024-07-19

## 2024-07-19 NOTE — TELEPHONE ENCOUNTER
Please fax 7/18 pre-op note to [facility], Ophthalmology, [phone].   Thank you!

## 2024-08-17 PROBLEM — Z01.818 PREOPERATIVE EXAMINATION: Status: RESOLVED | Noted: 2024-07-18 | Resolved: 2024-08-17

## 2024-10-05 DIAGNOSIS — E78.5 HYPERLIPIDEMIA ASSOCIATED WITH TYPE 2 DIABETES MELLITUS (HCC): ICD-10-CM

## 2024-10-05 DIAGNOSIS — E11.69 HYPERLIPIDEMIA ASSOCIATED WITH TYPE 2 DIABETES MELLITUS (HCC): ICD-10-CM

## 2024-10-07 RX ORDER — ROSUVASTATIN CALCIUM 20 MG/1
20 TABLET, COATED ORAL DAILY
Qty: 90 TABLET | Refills: 1 | Status: SHIPPED | OUTPATIENT
Start: 2024-10-07

## 2024-10-07 NOTE — TELEPHONE ENCOUNTER
Labs:   Lab Results   Component Value Date     07/15/2024    K 5.3 07/15/2024     07/15/2024    CO2 32 07/15/2024    BUN 28 (H) 07/15/2024    CREATININE 1.71 (H) 07/15/2024    GLUCOSE 297 (H) 07/15/2024    CALCIUM 8.9 07/15/2024    BILITOT 0.3 06/06/2024    ALKPHOS 68 06/06/2024    AST 20 06/06/2024    ALT 25 06/06/2024    LABGLOM 32 (L) 07/15/2024    AGRATIO 0.9 02/04/2023    GLOB 3.5 06/06/2024        Additional Notes:

## 2024-10-14 ENCOUNTER — TELEPHONE (OUTPATIENT)
Facility: CLINIC | Age: 69
End: 2024-10-14

## 2024-10-14 NOTE — TELEPHONE ENCOUNTER
----- Message from Gayle CROWELL sent at 10/14/2024  8:54 AM EDT -----  Regarding: ECC Appointment Request  ECC Appointment Request    Patient needs appointment for ECC Appointment Type: Pre-Op Visit.    Patient Requested Dates(s):After October 18, 2024   Patient Requested Time:Any time   Provider Name:Cheryl Puga NP-C    Reason for Appointment Request: Established Patient - Available appointments did not meet patient need, Patient need a Pre OP Visit, Surgery is on 11/18/24 for Cataract, Patient also speak Wolof, and she needs .   --------------------------------------------------------------------------------------------------------------------------    Relationship to Patient: Covered Entity Pippa,     Call Back Information: OK to leave message on voicemail  Preferred Call Back Number: Phone [phone]

## 2024-10-14 NOTE — TELEPHONE ENCOUNTER
Please call daughter and find out where pt is is having surgery and who will be performing surgery, then schedule for Pre Op for November.     Thank you!       Procedure/Surgery: Cataract   Date of Procedure/Surgery: 11/18/24   Surgeon: ?  Hospital/Surgical Facility: [facility]???

## 2024-10-15 ENCOUNTER — TELEPHONE (OUTPATIENT)
Facility: CLINIC | Age: 69
End: 2024-10-15

## 2024-10-15 NOTE — TELEPHONE ENCOUNTER
Contacted patients daughter to schedule pre op appointment, she will check the dates and call back when she gets the information

## 2024-10-22 ENCOUNTER — TELEPHONE (OUTPATIENT)
Facility: CLINIC | Age: 69
End: 2024-10-22

## 2024-10-22 NOTE — TELEPHONE ENCOUNTER
----- Message from Paul ACOSTA sent at 10/22/2024 11:20 AM EDT -----  Regarding: ECC Appointment Request  ECC Appointment Request    Patient needs appointment for ECC Appointment Type: Pre-Op Visit.    Patient Requested Dates(s):2 weeks before her surgery   Patient Requested Time: afternoon   Provider Name: Cheryl Puga NP-C      Reason for Appointment Request: Established Patient - No appointments available during search    -11/18 right eye,   -12/3 left eye surgery.    --------------------------------------------------------------------------------------------------------------------------    Relationship to Patient: Guardian -      Call Back Information: OK to leave message on voicemail  Preferred Call Back Number: Phone [phone]

## 2024-10-28 ENCOUNTER — TELEPHONE (OUTPATIENT)
Facility: CLINIC | Age: 69
End: 2024-10-28

## 2024-10-28 PROBLEM — R07.9 CHEST PAIN: Status: RESOLVED | Noted: 2023-02-03 | Resolved: 2024-10-28

## 2024-10-28 PROBLEM — R06.09 DYSPNEA ON EXERTION: Status: RESOLVED | Noted: 2023-02-03 | Resolved: 2024-10-28

## 2024-10-28 PROBLEM — K59.09 OTHER CONSTIPATION: Status: RESOLVED | Noted: 2024-06-12 | Resolved: 2024-10-28

## 2024-10-28 PROBLEM — N18.32 HYPERTENSIVE HEART AND KIDNEY DISEASE WITH CHRONIC SYSTOLIC CONGESTIVE HEART FAILURE AND STAGE 3B CHRONIC KIDNEY DISEASE (HCC): Status: ACTIVE | Noted: 2022-12-15

## 2024-10-28 PROBLEM — J18.9 COMMUNITY ACQUIRED PNEUMONIA OF RIGHT LOWER LOBE OF LUNG: Status: RESOLVED | Noted: 2024-03-11 | Resolved: 2024-10-28

## 2024-10-28 PROBLEM — I50.22 HYPERTENSIVE HEART AND KIDNEY DISEASE WITH CHRONIC SYSTOLIC CONGESTIVE HEART FAILURE AND STAGE 3B CHRONIC KIDNEY DISEASE (HCC): Status: ACTIVE | Noted: 2022-12-15

## 2024-10-28 PROBLEM — R35.0 URINARY FREQUENCY: Status: RESOLVED | Noted: 2023-05-08 | Resolved: 2024-10-28

## 2024-10-28 PROBLEM — R93.89 ENDOMETRIAL THICKENING ON ULTRASOUND: Status: RESOLVED | Noted: 2023-03-14 | Resolved: 2024-10-28

## 2024-10-28 PROBLEM — R79.89 ELEVATED VITAMIN B12 LEVEL: Status: RESOLVED | Noted: 2023-10-02 | Resolved: 2024-10-28

## 2024-10-28 PROBLEM — R79.89 ELEVATED BRAIN NATRIURETIC PEPTIDE (BNP) LEVEL: Status: RESOLVED | Noted: 2023-03-14 | Resolved: 2024-10-28

## 2024-10-28 PROBLEM — I13.0 HYPERTENSIVE HEART AND KIDNEY DISEASE WITH CHRONIC SYSTOLIC CONGESTIVE HEART FAILURE AND STAGE 3B CHRONIC KIDNEY DISEASE (HCC): Status: ACTIVE | Noted: 2022-12-15

## 2024-10-28 PROBLEM — N64.4 BREAST PAIN, LEFT: Status: RESOLVED | Noted: 2023-05-09 | Resolved: 2024-10-28

## 2024-10-28 PROBLEM — R22.43 LOCALIZED SWELLING OF BOTH LOWER LEGS: Status: RESOLVED | Noted: 2023-02-03 | Resolved: 2024-10-28

## 2024-10-28 LAB
ESTIMATED AVERAGE GLUCOSE: NORMAL
HBA1C MFR BLD: 9.1 %

## 2024-10-28 NOTE — TELEPHONE ENCOUNTER
Please call patient and/or daughter, and advise pt to bring in all medication bottles to tomorrow's appointment. Thank you!

## 2024-10-28 NOTE — ASSESSMENT & PLAN NOTE
Right eye cataract surgery scheduled for 11/18/24 at [facility] with Dr. Houser  Planned procedure not recommended d/t A1c >8, last A1c 9.1 on 10/15/24

## 2024-10-28 NOTE — ASSESSMENT & PLAN NOTE
Home care POC A1c 10/15/24, A1c 9.1  Per daughter, pt abruptly stopped ozempic on her own, d/t GI upset stomach, pt does not want to take ozempic, explains elevated A1c  Continue insulin glargine 46 units daily  Last seen pharmacy on 7/10/24 for assistance with medication management, recommended 4 week follow up, did not follow up   Stressed importance of regular follow up with pharmacy for diabetes management, as would not recommend planned procedure if A1c >8

## 2024-10-29 ENCOUNTER — OFFICE VISIT (OUTPATIENT)
Facility: CLINIC | Age: 69
End: 2024-10-29
Payer: COMMERCIAL

## 2024-10-29 VITALS
DIASTOLIC BLOOD PRESSURE: 75 MMHG | HEIGHT: 60 IN | OXYGEN SATURATION: 100 % | WEIGHT: 213 LBS | RESPIRATION RATE: 14 BRPM | TEMPERATURE: 98.2 F | SYSTOLIC BLOOD PRESSURE: 116 MMHG | BODY MASS INDEX: 41.82 KG/M2 | HEART RATE: 91 BPM

## 2024-10-29 DIAGNOSIS — E55.9 VITAMIN D DEFICIENCY: ICD-10-CM

## 2024-10-29 DIAGNOSIS — I13.0 HYPERTENSIVE HEART AND KIDNEY DISEASE WITH CHRONIC SYSTOLIC CONGESTIVE HEART FAILURE AND STAGE 3B CHRONIC KIDNEY DISEASE (HCC): ICD-10-CM

## 2024-10-29 DIAGNOSIS — E11.65 TYPE 2 DIABETES MELLITUS WITH HYPERGLYCEMIA, WITHOUT LONG-TERM CURRENT USE OF INSULIN (HCC): ICD-10-CM

## 2024-10-29 DIAGNOSIS — Z23 NEEDS FLU SHOT: ICD-10-CM

## 2024-10-29 DIAGNOSIS — I50.22 HYPERTENSIVE HEART AND KIDNEY DISEASE WITH CHRONIC SYSTOLIC CONGESTIVE HEART FAILURE AND STAGE 3B CHRONIC KIDNEY DISEASE (HCC): ICD-10-CM

## 2024-10-29 DIAGNOSIS — H25.9 AGE-RELATED CATARACT OF BOTH EYES, UNSPECIFIED AGE-RELATED CATARACT TYPE: ICD-10-CM

## 2024-10-29 DIAGNOSIS — N18.32 HYPERTENSIVE HEART AND KIDNEY DISEASE WITH CHRONIC SYSTOLIC CONGESTIVE HEART FAILURE AND STAGE 3B CHRONIC KIDNEY DISEASE (HCC): ICD-10-CM

## 2024-10-29 DIAGNOSIS — Z01.818 PREOPERATIVE EXAMINATION: Primary | ICD-10-CM

## 2024-10-29 DIAGNOSIS — I50.32 CHRONIC HEART FAILURE WITH PRESERVED EJECTION FRACTION (HCC): ICD-10-CM

## 2024-10-29 PROCEDURE — 1123F ACP DISCUSS/DSCN MKR DOCD: CPT

## 2024-10-29 PROCEDURE — 3046F HEMOGLOBIN A1C LEVEL >9.0%: CPT

## 2024-10-29 PROCEDURE — 90653 IIV ADJUVANT VACCINE IM: CPT

## 2024-10-29 PROCEDURE — 90471 IMMUNIZATION ADMIN: CPT

## 2024-10-29 PROCEDURE — 99215 OFFICE O/P EST HI 40 MIN: CPT

## 2024-10-29 SDOH — ECONOMIC STABILITY: FOOD INSECURITY: WITHIN THE PAST 12 MONTHS, THE FOOD YOU BOUGHT JUST DIDN'T LAST AND YOU DIDN'T HAVE MONEY TO GET MORE.: NEVER TRUE

## 2024-10-29 SDOH — ECONOMIC STABILITY: INCOME INSECURITY: HOW HARD IS IT FOR YOU TO PAY FOR THE VERY BASICS LIKE FOOD, HOUSING, MEDICAL CARE, AND HEATING?: NOT HARD AT ALL

## 2024-10-29 SDOH — ECONOMIC STABILITY: FOOD INSECURITY: WITHIN THE PAST 12 MONTHS, YOU WORRIED THAT YOUR FOOD WOULD RUN OUT BEFORE YOU GOT MONEY TO BUY MORE.: NEVER TRUE

## 2024-10-29 ASSESSMENT — PATIENT HEALTH QUESTIONNAIRE - PHQ9
SUM OF ALL RESPONSES TO PHQ QUESTIONS 1-9: 0
1. LITTLE INTEREST OR PLEASURE IN DOING THINGS: NOT AT ALL
SUM OF ALL RESPONSES TO PHQ9 QUESTIONS 1 & 2: 0
SUM OF ALL RESPONSES TO PHQ QUESTIONS 1-9: 0
SUM OF ALL RESPONSES TO PHQ QUESTIONS 1-9: 0
2. FEELING DOWN, DEPRESSED OR HOPELESS: NOT AT ALL
SUM OF ALL RESPONSES TO PHQ QUESTIONS 1-9: 0

## 2024-10-29 NOTE — PROGRESS NOTES
Preoperative Evaluation    SUBJECTIVE:     Pt speaks the language, Maltese, the  services were used to conduct this visit.  number [id number].    Date of Exam: 10/30/2024     Joie Rivas is a 68 y.o. female 1955 who presents for preoperative evaluation.   Procedure/Surgery:right eye cataract  Date of Procedure/Surgery: 11/18/24  Surgeon: Artur Houser MD   Hospital/Surgical Facility: [facility]  Primary Physician: Cheryl Puga NPWilmerC   Latex Allergy: No    Recent use of: yes, recent use of aspirin (ASA)  Tetanus up to date: last tetanus booster within 10 years    Anesthesia Complications: No  History of abnormal bleeding : No  History of Blood Transfusions: No  Health Care Directive or Living Will: No     via [facility] was unable to translate for patient, as  stated she cannot understand the patient.   Spoke with daughter, Carole, via telephone, to assist with translation. Per daughter, pt does not have clearance from cardiology. Daughter states pt stopped ozempic for a month, and restarted ozempic last Saturday. Daughter states pt does not want to go back to ozempic because she had a bad reaction. States pt stated she's \"never\" going back to ozempic. Last took ozempic 13 days ago.     Review of Systems   Constitutional:  Negative for chills and fever.   Respiratory:  Negative for chest tightness and shortness of breath.    Cardiovascular:  Negative for chest pain, palpitations and leg swelling.   Gastrointestinal:  Negative for abdominal pain, diarrhea, nausea and vomiting.   Genitourinary:  Negative for dysuria, frequency and urgency.   Skin:  Negative for rash.   Neurological:  Negative for dizziness, weakness, light-headedness and headaches.        OBJECTIVE:   /75 (Site: Left Upper Arm)   Pulse 91   Temp 98.2 °F (36.8 °C)   Resp 14   Ht 1.524 m (5')   Wt 96.6 kg (213 lb)   SpO2 100%   BMI 41.60 kg/m²     Physical Exam  Vitals and

## 2024-10-29 NOTE — PATIENT INSTRUCTIONS
[facility]   [address]   [address]   [place] [address] [phone]    Call Noel Mari, Pharmacist for diabetes management.

## 2024-10-29 NOTE — PROGRESS NOTES
Joie Bucio for   Chief Complaint   Patient presents with    Pre-op Exam              Obtained signed  Immunization Consent Form. Patient received  Injection of fluad administered in left arm Site. Verified by Lucina DOUGHERTY and Cheryl MCFARLAND  that this is the correct immunization/injection. Patient observed for 15 minutes with no adverse reaction.

## 2024-10-30 PROBLEM — Z01.818 PREOPERATIVE EXAMINATION: Status: ACTIVE | Noted: 2024-10-30

## 2024-10-30 ASSESSMENT — ENCOUNTER SYMPTOMS
NAUSEA: 0
ABDOMINAL PAIN: 0
VOMITING: 0
CHEST TIGHTNESS: 0
DIARRHEA: 0
SHORTNESS OF BREATH: 0

## 2024-10-30 NOTE — PROGRESS NOTES
rosuvastatin (CRESTOR) 20 MG tablet take 1 tablet by mouth once daily    Insulin Pen Needle 32G X 5 MM MISC 1 each by Does not apply route daily    insulin glargine (BASAGLAR KWIKPEN) 100 UNIT/ML injection pen Inject 46 Units into the skin every morning    docusate sodium (COLACE) 100 MG capsule Take 1 capsule by mouth daily as needed for Constipation    Continuous Glucose Sensor (DEXCOM G7 SENSOR) MISC Use to monitor blood glucose continuously - change every 10 days (Patient not taking: Reported on 7/10/2024)    Continuous Glucose  (DEXCOM G7 ) SHANICE Use to monitor blood glucose continuously (Patient not taking: Reported on 7/10/2024)    Blood Glucose Monitoring Suppl (TRUE METRIX AIR GLUCOSE METER) SHANICE by Does not apply route    blood glucose test strips (RELION TRUE METRIX TEST STRIPS) strip 1 each by In Vitro route daily As needed.    Blood Glucose Monitoring Suppl (TRUE METRIX AIR GLUCOSE METER) w/Device KIT 1 each by Does not apply route in the morning, at noon, in the evening, and at bedtime    solifenacin (VESICARE) 10 MG tablet Take 1 tablet by mouth daily    ASPIRIN LOW DOSE 81 MG EC tablet take 1 tablet by mouth once daily    carvedilol (COREG) 25 MG tablet take 1 tablet by mouth twice a day    budesonide-formoterol (SYMBICORT) 160-4.5 MCG/ACT AERO Inhale 2 puffs into the lungs 2 times daily    amLODIPine (NORVASC) 10 MG tablet Take 1 tablet by mouth nightly at bedtime.    albuterol sulfate HFA (PROVENTIL;VENTOLIN;PROAIR) 108 (90 Base) MCG/ACT inhaler inhale 2 puffs by mouth and INTO THE LUNGS every 4 hours if needed for wheezing or shortness of breath    bumetanide (BUMEX) 1 MG tablet 1 tablet 2 times daily    hydrALAZINE (APRESOLINE) 100 MG tablet 1 tablet 2 times daily     No current facility-administered medications for this visit.     Allergies:  Allergies   Allergen Reactions    Pork Allergy Other (See Comments)     Patient is [employer], no PORK       Blood Glucose Monitoring (BGM) or

## 2024-10-30 NOTE — ASSESSMENT & PLAN NOTE
Diabetes unstable, A1c 9.1  Planned procedure note recommended d/t A1c >8  Advised pt and daughter to follow up with pharmacy for assistance with management

## 2024-10-30 NOTE — ASSESSMENT & PLAN NOTE
BP at goal, <130/80, continue current regimen  Continue to follow up with cardiology  Recommend cardiology clearance prior to planned procedure

## 2024-10-31 ENCOUNTER — PHARMACY VISIT (OUTPATIENT)
Facility: CLINIC | Age: 69
End: 2024-10-31

## 2024-10-31 DIAGNOSIS — E11.65 TYPE 2 DIABETES MELLITUS WITH HYPERGLYCEMIA, WITH LONG-TERM CURRENT USE OF INSULIN (HCC): Primary | ICD-10-CM

## 2024-10-31 DIAGNOSIS — Z79.4 TYPE 2 DIABETES MELLITUS WITH HYPERGLYCEMIA, WITH LONG-TERM CURRENT USE OF INSULIN (HCC): Primary | ICD-10-CM

## 2024-10-31 RX ORDER — SEMAGLUTIDE 0.68 MG/ML
INJECTION, SOLUTION SUBCUTANEOUS
Qty: 3 ML | Refills: 0 | Status: SHIPPED | OUTPATIENT
Start: 2024-10-31 | End: 2024-12-12

## 2024-10-31 NOTE — PATIENT INSTRUCTIONS
- Restart Ozempic 0.25mg every week for 4 weeks and then increase to 0.5mg weekly    - Increase Basaglar to 46 units every morning    For any questions, please call [phone] or your PCP office.

## 2024-11-06 ENCOUNTER — TELEPHONE (OUTPATIENT)
Facility: CLINIC | Age: 69
End: 2024-11-06

## 2024-11-06 NOTE — TELEPHONE ENCOUNTER
Mari from [facility] called in and suggested that we start verbal authorization. Phone number is [phone]

## 2024-11-06 NOTE — TELEPHONE ENCOUNTER
Per patients daughter, Todd is waiting on PA form to be signed and faxed back, for Continuous Glucose  (DEXCOM G7 )

## 2024-11-07 ENCOUNTER — TELEPHONE (OUTPATIENT)
Dept: PHARMACY | Facility: CLINIC | Age: 69
End: 2024-11-07

## 2024-11-07 NOTE — TELEPHONE ENCOUNTER
[employer] called,     Cannot provide verbal order.  A PA needs to be faxed over and signed by provider then faxed to insurance company for approval.     Fax [phone]

## 2024-11-07 NOTE — TELEPHONE ENCOUNTER
*Incoming Call*    Patient called in and left a message asking to remind Noel Mari to send authorization form for Dexcom to [employer]. Patient is also requesting a call back at [phone]    Charity Patel [facility]   Ambulatory Pharmacy Clinical   [phone]  Department, toll free: [phone], option 2     For Pharmacy Admin Tracking Only    Program: Medical Group  Gap Closed?: Yes   Time Spent (min): 5

## 2024-11-11 ENCOUNTER — TELEPHONE (OUTPATIENT)
Facility: CLINIC | Age: 69
End: 2024-11-11

## 2024-11-11 NOTE — TELEPHONE ENCOUNTER
Pt stated she received call from [facility] delivery regarding Diabetes device     Pt stated she received referral from Dr. Puga and Dr. Patterson sent a pre-authorization    Pt questioning which doctor she needs to go through for device     Pt requesting call back please

## 2024-11-12 ENCOUNTER — HOSPITAL ENCOUNTER (OUTPATIENT)
Facility: HOSPITAL | Age: 69
Discharge: HOME | End: 2024-11-15
Payer: MEDICARE

## 2024-11-12 LAB
25(OH)D3 SERPL-MCNC: 41.8 NG/ML (ref 30–100)
ALBUMIN SERPL-MCNC: 3.3 G/DL (ref 3.4–5)
ANION GAP SERPL CALC-SCNC: 6 MMOL/L (ref 3–18)
BUN SERPL-MCNC: 44 MG/DL (ref 7–18)
BUN/CREAT SERPL: 21 (ref 12–20)
CALCIUM SERPL-MCNC: 9.1 MG/DL (ref 8.5–10.1)
CHLORIDE SERPL-SCNC: 104 MMOL/L (ref 100–111)
CO2 SERPL-SCNC: 29 MMOL/L (ref 21–32)
CREAT SERPL-MCNC: 2.13 MG/DL (ref 0.6–1.3)
ERYTHROCYTE [DISTWIDTH] IN BLOOD BY AUTOMATED COUNT: 13 % (ref 11.6–14.5)
GLUCOSE SERPL-MCNC: 303 MG/DL (ref 74–99)
HCT VFR BLD AUTO: 31.8 % (ref 35–45)
HGB BLD-MCNC: 9.8 G/DL (ref 12–16)
MCH RBC QN AUTO: 24.6 PG (ref 24–34)
MCHC RBC AUTO-ENTMCNC: 30.8 G/DL (ref 31–37)
MCV RBC AUTO: 79.7 FL (ref 78–100)
NRBC # BLD: 0 K/UL (ref 0–0.01)
NRBC BLD-RTO: 0 PER 100 WBC
PHOSPHATE SERPL-MCNC: 4.2 MG/DL (ref 2.5–4.9)
PLATELET # BLD AUTO: 199 K/UL (ref 135–420)
PMV BLD AUTO: 11.5 FL (ref 9.2–11.8)
POTASSIUM SERPL-SCNC: 4.5 MMOL/L (ref 3.5–5.5)
RBC # BLD AUTO: 3.99 M/UL (ref 4.2–5.3)
SODIUM SERPL-SCNC: 139 MMOL/L (ref 136–145)
WBC # BLD AUTO: 3.8 K/UL (ref 4.6–13.2)

## 2024-11-12 PROCEDURE — 82570 ASSAY OF URINE CREATININE: CPT

## 2024-11-12 PROCEDURE — 36415 COLL VENOUS BLD VENIPUNCTURE: CPT

## 2024-11-12 PROCEDURE — 85027 COMPLETE CBC AUTOMATED: CPT

## 2024-11-12 PROCEDURE — 82306 VITAMIN D 25 HYDROXY: CPT

## 2024-11-12 PROCEDURE — 80069 RENAL FUNCTION PANEL: CPT

## 2024-11-12 PROCEDURE — 82043 UR ALBUMIN QUANTITATIVE: CPT

## 2024-11-12 PROCEDURE — 83970 ASSAY OF PARATHORMONE: CPT

## 2024-11-13 LAB
CALCIUM SERPL-MCNC: 9.4 MG/DL (ref 8.5–10.1)
CREAT UR-MCNC: 98 MG/DL (ref 30–125)
MICROALBUMIN UR-MCNC: 14.5 MG/DL (ref 0–3)
MICROALBUMIN/CREAT UR-RTO: 148 MG/G (ref 0–30)
PTH-INTACT SERPL-MCNC: 233.4 PG/ML (ref 18.4–88)

## 2024-11-16 DIAGNOSIS — I10 ESSENTIAL HYPERTENSION: ICD-10-CM

## 2024-11-21 NOTE — TELEPHONE ENCOUNTER
Labs:   Lab Results   Component Value Date     11/12/2024    K 4.5 11/12/2024     11/12/2024    CO2 29 11/12/2024    BUN 44 (H) 11/12/2024    CREATININE 2.13 (H) 11/12/2024    GLUCOSE 303 (H) 11/12/2024    CALCIUM 9.1 11/12/2024    BILITOT 0.3 06/06/2024    ALKPHOS 68 06/06/2024    AST 20 06/06/2024    ALT 25 06/06/2024    LABGLOM 25 (L) 11/12/2024    AGRATIO 0.9 02/04/2023    GLOB 3.5 06/06/2024        Additional Notes:

## 2024-11-22 RX ORDER — ASPIRIN 81 MG/1
81 TABLET, COATED ORAL DAILY
Qty: 90 TABLET | Refills: 1 | Status: SHIPPED | OUTPATIENT
Start: 2024-11-22

## 2024-11-26 ENCOUNTER — HOSPITAL ENCOUNTER (OUTPATIENT)
Facility: HOSPITAL | Age: 69
Discharge: HOME | End: 2024-11-29
Payer: MEDICARE

## 2024-11-26 DIAGNOSIS — E11.65 TYPE 2 DIABETES MELLITUS WITH HYPERGLYCEMIA, WITHOUT LONG-TERM CURRENT USE OF INSULIN (HCC): ICD-10-CM

## 2024-11-26 DIAGNOSIS — E55.9 VITAMIN D DEFICIENCY: ICD-10-CM

## 2024-11-26 DIAGNOSIS — I50.32 CHRONIC HEART FAILURE WITH PRESERVED EJECTION FRACTION (HCC): ICD-10-CM

## 2024-11-26 DIAGNOSIS — I13.0 HYPERTENSIVE HEART AND KIDNEY DISEASE WITH CHRONIC SYSTOLIC CONGESTIVE HEART FAILURE AND STAGE 3B CHRONIC KIDNEY DISEASE (HCC): ICD-10-CM

## 2024-11-26 DIAGNOSIS — I10 ESSENTIAL HYPERTENSION: ICD-10-CM

## 2024-11-26 DIAGNOSIS — N18.32 HYPERTENSIVE HEART AND KIDNEY DISEASE WITH CHRONIC SYSTOLIC CONGESTIVE HEART FAILURE AND STAGE 3B CHRONIC KIDNEY DISEASE (HCC): ICD-10-CM

## 2024-11-26 DIAGNOSIS — I50.22 HYPERTENSIVE HEART AND KIDNEY DISEASE WITH CHRONIC SYSTOLIC CONGESTIVE HEART FAILURE AND STAGE 3B CHRONIC KIDNEY DISEASE (HCC): ICD-10-CM

## 2024-11-26 LAB
25(OH)D3 SERPL-MCNC: 41 NG/ML (ref 30–100)
ALBUMIN SERPL-MCNC: 3.4 G/DL (ref 3.4–5)
ALBUMIN/GLOB SERPL: 1 (ref 0.8–1.7)
ALP SERPL-CCNC: 58 U/L (ref 45–117)
ALT SERPL-CCNC: 27 U/L (ref 13–56)
ANION GAP SERPL CALC-SCNC: 5 MMOL/L (ref 3–18)
AST SERPL-CCNC: 21 U/L (ref 10–38)
BASOPHILS # BLD: 0 K/UL (ref 0–0.1)
BASOPHILS NFR BLD: 1 % (ref 0–2)
BILIRUB SERPL-MCNC: 0.3 MG/DL (ref 0.2–1)
BUN SERPL-MCNC: 34 MG/DL (ref 7–18)
BUN/CREAT SERPL: 20 (ref 12–20)
CALCIUM SERPL-MCNC: 9.2 MG/DL (ref 8.5–10.1)
CHLORIDE SERPL-SCNC: 105 MMOL/L (ref 100–111)
CHOLEST SERPL-MCNC: 107 MG/DL
CO2 SERPL-SCNC: 28 MMOL/L (ref 21–32)
CREAT SERPL-MCNC: 1.7 MG/DL (ref 0.6–1.3)
DIFFERENTIAL METHOD BLD: ABNORMAL
EOSINOPHIL # BLD: 0.3 K/UL (ref 0–0.4)
EOSINOPHIL NFR BLD: 7 % (ref 0–5)
ERYTHROCYTE [DISTWIDTH] IN BLOOD BY AUTOMATED COUNT: 13.4 % (ref 11.6–14.5)
EST. AVERAGE GLUCOSE BLD GHB EST-MCNC: 189 MG/DL
GLOBULIN SER CALC-MCNC: 3.5 G/DL (ref 2–4)
GLUCOSE SERPL-MCNC: 134 MG/DL (ref 74–99)
HBA1C MFR BLD: 8.2 % (ref 4.2–5.6)
HCT VFR BLD AUTO: 33.6 % (ref 35–45)
HDLC SERPL-MCNC: 40 MG/DL (ref 40–60)
HDLC SERPL: 2.7 (ref 0–5)
HGB BLD-MCNC: 10.2 G/DL (ref 12–16)
IMM GRANULOCYTES # BLD AUTO: 0 K/UL (ref 0–0.04)
IMM GRANULOCYTES NFR BLD AUTO: 0 % (ref 0–0.5)
LDLC SERPL CALC-MCNC: 46.2 MG/DL (ref 0–100)
LIPID PANEL: NORMAL
LYMPHOCYTES # BLD: 1.8 K/UL (ref 0.9–3.6)
LYMPHOCYTES NFR BLD: 39 % (ref 21–52)
MCH RBC QN AUTO: 24.1 PG (ref 24–34)
MCHC RBC AUTO-ENTMCNC: 30.4 G/DL (ref 31–37)
MCV RBC AUTO: 79.2 FL (ref 78–100)
MONOCYTES # BLD: 0.4 K/UL (ref 0.05–1.2)
MONOCYTES NFR BLD: 8 % (ref 3–10)
NEUTS SEG # BLD: 2 K/UL (ref 1.8–8)
NEUTS SEG NFR BLD: 45 % (ref 40–73)
NRBC # BLD: 0 K/UL (ref 0–0.01)
NRBC BLD-RTO: 0 PER 100 WBC
PLATELET # BLD AUTO: 235 K/UL (ref 135–420)
PMV BLD AUTO: 11 FL (ref 9.2–11.8)
POTASSIUM SERPL-SCNC: 4.8 MMOL/L (ref 3.5–5.5)
PROT SERPL-MCNC: 6.9 G/DL (ref 6.4–8.2)
RBC # BLD AUTO: 4.24 M/UL (ref 4.2–5.3)
SODIUM SERPL-SCNC: 138 MMOL/L (ref 136–145)
TRIGL SERPL-MCNC: 104 MG/DL
VLDLC SERPL CALC-MCNC: 20.8 MG/DL
WBC # BLD AUTO: 4.5 K/UL (ref 4.6–13.2)

## 2024-11-26 PROCEDURE — 80053 COMPREHEN METABOLIC PANEL: CPT

## 2024-11-26 PROCEDURE — 82306 VITAMIN D 25 HYDROXY: CPT

## 2024-11-26 PROCEDURE — 36415 COLL VENOUS BLD VENIPUNCTURE: CPT

## 2024-11-26 PROCEDURE — 85025 COMPLETE CBC W/AUTO DIFF WBC: CPT

## 2024-11-26 PROCEDURE — 80061 LIPID PANEL: CPT

## 2024-11-26 PROCEDURE — 83036 HEMOGLOBIN GLYCOSYLATED A1C: CPT

## 2024-11-29 PROBLEM — Z01.818 PREOPERATIVE EXAMINATION: Status: RESOLVED | Noted: 2024-10-30 | Resolved: 2024-11-29

## 2024-12-02 NOTE — PROGRESS NOTES
2023 02:04 PM    LABGLOM 51 2023 11:27 AM     Wt Readings from Last 3 Encounters:   24 97.5 kg (215 lb)   10/29/24 96.6 kg (213 lb)   24 95.7 kg (211 lb)     Ht Readings from Last 1 Encounters:   24 1.651 m (5' 5\")     Calculated estimated creatinine clearance: estimated creatinine clearance is 37 mL/min (A) (based on SCr of 1.7 mg/dL (H)).    Vital Signs Today:    There were no vitals taken for this visit.    Medications Discontinued During This Encounter   Medication Reason    amLODIPine (NORVASC) 10 MG tablet DOSE ADJUSTMENT    Semaglutide,0.25 or 0.5MG/DOS, (OZEMPIC, 0.25 OR 0.5 MG/DOSE,) 2 MG/3ML SOPN DOSE ADJUSTMENT    Insulin Pen Needle 32G X 5 MM MISC Cost of medication    blood glucose test strips (RELION TRUE METRIX TEST STRIPS) strip DOSE ADJUSTMENT       Orders Placed This Encounter    FARXIGA 10 MG tablet     Sig: Take 1 tablet by mouth daily    omeprazole (PRILOSEC) 20 MG delayed release capsule     Sig: Take 1 capsule by mouth daily    amLODIPine (NORVASC) 5 MG tablet     Sig: Take 1 tablet by mouth daily    insulin glargine (BASAGLAR KWIKPEN) 100 UNIT/ML injection pen     Sig: Inject 46 Units into the skin every morning     Dispense:  15 Adjustable Dose Pre-filled Pen Syringe     Refill:  11    Semaglutide, 1 MG/DOSE, (OZEMPIC, 1 MG/DOSE,) 4 MG/3ML SOPN sc injection     Sig: Inject 1 mg into the skin every 7 days     Dispense:  3 mL     Refill:  1    Insulin Pen Needle 32G X 4 MM MISC     Si each by Does not apply route daily     Dispense:  100 each     Refill:  3    blood glucose test strips (TRUE METRIX BLOOD GLUCOSE TEST) strip     Sig: Use to check blood glucose twice daily     Dispense:  200 each     Refill:  3       Future Appointments   Date Time Provider Department Center   2024 10:45 AM Cheryl Puga NP-C [facility] DEP   2025 10:30 AM Noel Mari, [facility] DEP   2025 10:30 AM Cheryl Puga NP-C [facility] DEP

## 2024-12-04 ENCOUNTER — PHARMACY VISIT (OUTPATIENT)
Facility: CLINIC | Age: 69
End: 2024-12-04

## 2024-12-04 DIAGNOSIS — Z79.4 TYPE 2 DIABETES MELLITUS WITH HYPERGLYCEMIA, WITH LONG-TERM CURRENT USE OF INSULIN (HCC): Primary | ICD-10-CM

## 2024-12-04 DIAGNOSIS — E11.65 TYPE 2 DIABETES MELLITUS WITH HYPERGLYCEMIA, WITH LONG-TERM CURRENT USE OF INSULIN (HCC): Primary | ICD-10-CM

## 2024-12-04 RX ORDER — SEMAGLUTIDE 1.34 MG/ML
1 INJECTION, SOLUTION SUBCUTANEOUS
Qty: 3 ML | Refills: 1 | Status: SHIPPED | OUTPATIENT
Start: 2024-12-04

## 2024-12-04 RX ORDER — INSULIN GLARGINE 100 [IU]/ML
46 INJECTION, SOLUTION SUBCUTANEOUS EVERY MORNING
Qty: 15 ADJUSTABLE DOSE PRE-FILLED PEN SYRINGE | Refills: 11 | Status: SHIPPED | OUTPATIENT
Start: 2024-12-04

## 2024-12-04 RX ORDER — AMLODIPINE BESYLATE 5 MG/1
5 TABLET ORAL DAILY
COMMUNITY

## 2024-12-04 RX ORDER — CALCIUM CITRATE/VITAMIN D3 200MG-6.25
TABLET ORAL
Qty: 200 EACH | Refills: 3 | Status: SHIPPED | OUTPATIENT
Start: 2024-12-04

## 2024-12-04 RX ORDER — DAPAGLIFLOZIN 10 MG/1
10 TABLET, FILM COATED ORAL DAILY
COMMUNITY
Start: 2024-11-22

## 2024-12-04 NOTE — PATIENT INSTRUCTIONS
- Continue Basaglar 46 units every morning    - Finish your current Ozempic pen with your last 0.5mg dose and then move to the new Ozempic 1mg weekly pen    - Start the docusate daily and increase fluid intake to help with constipation

## 2024-12-05 PROBLEM — E03.9 HYPOTHYROIDISM: Status: ACTIVE | Noted: 2024-12-05

## 2024-12-05 PROBLEM — I65.29 CAROTID ARTERY OCCLUSION: Status: ACTIVE | Noted: 2024-12-05

## 2024-12-05 PROBLEM — N32.81 OVERACTIVE BLADDER: Status: ACTIVE | Noted: 2024-12-05

## 2024-12-05 PROBLEM — Z86.73 HISTORY OF TIA (TRANSIENT ISCHEMIC ATTACK): Status: ACTIVE | Noted: 2024-12-05

## 2024-12-05 PROBLEM — E11.42 POLYNEUROPATHY DUE TO TYPE 2 DIABETES MELLITUS (HCC): Chronic | Status: ACTIVE | Noted: 2024-10-16

## 2024-12-05 PROBLEM — G45.9 TRANSIENT ISCHEMIC ATTACK: Status: ACTIVE | Noted: 2024-12-05

## 2024-12-26 ASSESSMENT — ENCOUNTER SYMPTOMS: SHORTNESS OF BREATH: 0

## 2024-12-26 NOTE — ASSESSMENT & PLAN NOTE
BP at goal, <130/80, continue current regimen  Cardiology notes from 11/19/24 reviewed, plan: decreased amlodipine to 5 mg daily d/t edema, increased carvedilol to 25 mg BID, continue hydralazine 100 mg BID. Follow up in 8 months.

## 2024-12-26 NOTE — ASSESSMENT & PLAN NOTE
Urology notes from 11/14/24 reviewed, plan: referred to urodynamics to evaluate bladder pressure and flow, ordered myrbetriq 50 mg x30 days trial, consider third line therapy options of no benefit with medical therapy via bladder Botox . Follow up, as scheduled, on 2/5/25.

## 2024-12-26 NOTE — ASSESSMENT & PLAN NOTE
GFR 32, improving, continue to avoid NSAIDS and increase hydration  Continue to follow up with nephrology for management. Will obtain notes

## 2024-12-26 NOTE — ASSESSMENT & PLAN NOTE
A1c 8.2, improving, remains above goal, goal <7, repeat A1c in 3 months  Advised pt on lifestyle modifications  PharmD notes from 12/4/24 reviewed, plan: continue insulin basaglar 46 units daily, increase ozempic to 1 mg weekly, continue farxiga 10 mg daily. Follow up, as scheduled, on 1/17/25.   A1c must be <8, before pt can proceed with planned procedure, eye surgery

## 2024-12-26 NOTE — ASSESSMENT & PLAN NOTE
Unclear onset, identified through medical records from [facility]  Reports history of thyroid surgery  Asymptomatic  Check TSH and T4 levels

## 2024-12-27 ENCOUNTER — OFFICE VISIT (OUTPATIENT)
Facility: CLINIC | Age: 69
End: 2024-12-27
Payer: MEDICARE

## 2024-12-27 VITALS
RESPIRATION RATE: 16 BRPM | TEMPERATURE: 97.8 F | HEIGHT: 60 IN | HEART RATE: 91 BPM | SYSTOLIC BLOOD PRESSURE: 112 MMHG | WEIGHT: 218 LBS | OXYGEN SATURATION: 99 % | BODY MASS INDEX: 42.8 KG/M2 | DIASTOLIC BLOOD PRESSURE: 71 MMHG

## 2024-12-27 DIAGNOSIS — I50.22 HYPERTENSIVE HEART AND KIDNEY DISEASE WITH CHRONIC SYSTOLIC CONGESTIVE HEART FAILURE AND STAGE 3B CHRONIC KIDNEY DISEASE (HCC): Primary | ICD-10-CM

## 2024-12-27 DIAGNOSIS — Z12.31 ENCOUNTER FOR SCREENING MAMMOGRAM FOR BREAST CANCER: ICD-10-CM

## 2024-12-27 DIAGNOSIS — E11.65 TYPE 2 DIABETES MELLITUS WITH HYPERGLYCEMIA, WITHOUT LONG-TERM CURRENT USE OF INSULIN (HCC): ICD-10-CM

## 2024-12-27 DIAGNOSIS — N32.81 OVERACTIVE BLADDER: ICD-10-CM

## 2024-12-27 DIAGNOSIS — N18.31 STAGE 3A CHRONIC KIDNEY DISEASE (HCC): ICD-10-CM

## 2024-12-27 DIAGNOSIS — N18.32 HYPERTENSIVE HEART AND KIDNEY DISEASE WITH CHRONIC SYSTOLIC CONGESTIVE HEART FAILURE AND STAGE 3B CHRONIC KIDNEY DISEASE (HCC): Primary | ICD-10-CM

## 2024-12-27 DIAGNOSIS — E03.9 HYPOTHYROIDISM, UNSPECIFIED TYPE: ICD-10-CM

## 2024-12-27 DIAGNOSIS — I65.23 BILATERAL CAROTID ARTERY OCCLUSION: ICD-10-CM

## 2024-12-27 DIAGNOSIS — E53.8 VITAMIN B12 DEFICIENCY: ICD-10-CM

## 2024-12-27 DIAGNOSIS — I13.0 HYPERTENSIVE HEART AND KIDNEY DISEASE WITH CHRONIC SYSTOLIC CONGESTIVE HEART FAILURE AND STAGE 3B CHRONIC KIDNEY DISEASE (HCC): Primary | ICD-10-CM

## 2024-12-27 PROCEDURE — 1123F ACP DISCUSS/DSCN MKR DOCD: CPT

## 2024-12-27 PROCEDURE — 3052F HG A1C>EQUAL 8.0%<EQUAL 9.0%: CPT

## 2024-12-27 PROCEDURE — 99214 OFFICE O/P EST MOD 30 MIN: CPT

## 2024-12-27 SDOH — ECONOMIC STABILITY: FOOD INSECURITY: WITHIN THE PAST 12 MONTHS, YOU WORRIED THAT YOUR FOOD WOULD RUN OUT BEFORE YOU GOT MONEY TO BUY MORE.: PATIENT DECLINED

## 2024-12-27 SDOH — ECONOMIC STABILITY: INCOME INSECURITY: HOW HARD IS IT FOR YOU TO PAY FOR THE VERY BASICS LIKE FOOD, HOUSING, MEDICAL CARE, AND HEATING?: PATIENT DECLINED

## 2024-12-27 SDOH — ECONOMIC STABILITY: FOOD INSECURITY: WITHIN THE PAST 12 MONTHS, THE FOOD YOU BOUGHT JUST DIDN'T LAST AND YOU DIDN'T HAVE MONEY TO GET MORE.: PATIENT DECLINED

## 2024-12-27 ASSESSMENT — ENCOUNTER SYMPTOMS
ABDOMINAL PAIN: 0
CONSTIPATION: 1

## 2024-12-27 ASSESSMENT — PATIENT HEALTH QUESTIONNAIRE - PHQ9
1. LITTLE INTEREST OR PLEASURE IN DOING THINGS: NOT AT ALL
SUM OF ALL RESPONSES TO PHQ QUESTIONS 1-9: 0
2. FEELING DOWN, DEPRESSED OR HOPELESS: NOT AT ALL
SUM OF ALL RESPONSES TO PHQ9 QUESTIONS 1 & 2: 0

## 2024-12-27 NOTE — ASSESSMENT & PLAN NOTE
Unclear onset, identified through medical records from [facility]  Ordered bilateral carotid duplex to confirm

## 2024-12-27 NOTE — ASSESSMENT & PLAN NOTE
Reports intermittent tingling to bilateral upper and lower extremities  Restart vitamin B12, check vitamin B12 level in two months  If no improvement, likely diabetic peripheral neuropathy, consider gabapentin

## 2024-12-27 NOTE — PROGRESS NOTES
Chief Complaint   Patient presents with    Hypertension    Chronic Kidney Disease    Diabetes    Hypothyroidism    Overactive bladder     Assessment & Plan:     1. 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 Follow up in 8 months.   Orders:  -     Comprehensive Metabolic Panel; Future  2. Stage 3a chronic kidney disease (HCC)  Assessment & Plan:  GFR 32, improving, continue to avoid NSAIDS and increase hydration  Continue to follow up with nephrology for management. Will obtain notes    Orders:  -     Comprehensive Metabolic Panel; Future  3. Type 2 diabetes mellitus with hyperglycemia, without long-term current use of insulin ([facility])  Assessment & Plan:  A1c 8.2, improving, remains above goal, goal <7, repeat A1c in 3 months  Advised pt on lifestyle modifications  PharmD notes from 12/4/24 reviewed, plan: continue insulin basaglar 46 units daily, increase ozempic to 1 mg weekly, continue farxiga 10 mg daily. Follow up, as scheduled, on 1/17/25.   A1c must be <8, before pt can proceed with planned procedure, eye surgery  Orders:  -     Comprehensive Metabolic Panel; Future  -     Hemoglobin A1C; Future  4. Hypothyroidism, unspecified type  Assessment & Plan:  Unclear onset, identified through medical records from [facility]  Reports history of thyroid surgery  Asymptomatic  Check TSH and T4 levels  Orders:  -     T4, Free; Future  -     TSH; Future  5. Overactive bladder  Assessment & Plan:  Urology notes from 11/14/24 reviewed, plan: referred to urodynamics to evaluate bladder pressure and flow, ordered myrbetriq 50 mg x30 days trial, consider third line therapy options of no benefit with medical 
Due    Diabetic foot exam  Never done    Shingles vaccine (1 of 2) Never done    Respiratory Syncytial Virus (RSV) Pregnant or age 60 yrs+ (1 - Risk 60-74 years 1-dose series) Never done    COVID-19 Vaccine (1 - 2023-24 season) Never done   .        \"Have you been to the ER, urgent care clinic since your last visit?  Hospitalized since your last visit?\"    NO    “Have you seen or consulted any other health care providers outside of [facility] since your last visit?”    NO            Click Here for Release of Records Request

## 2025-01-13 DIAGNOSIS — I10 ESSENTIAL HYPERTENSION: ICD-10-CM

## 2025-01-13 DIAGNOSIS — E11.65 TYPE 2 DIABETES MELLITUS WITH HYPERGLYCEMIA, WITH LONG-TERM CURRENT USE OF INSULIN (HCC): ICD-10-CM

## 2025-01-13 DIAGNOSIS — Z79.4 TYPE 2 DIABETES MELLITUS WITH HYPERGLYCEMIA, WITH LONG-TERM CURRENT USE OF INSULIN (HCC): ICD-10-CM

## 2025-01-13 DIAGNOSIS — E11.69 HYPERLIPIDEMIA ASSOCIATED WITH TYPE 2 DIABETES MELLITUS (HCC): ICD-10-CM

## 2025-01-13 DIAGNOSIS — E78.5 HYPERLIPIDEMIA ASSOCIATED WITH TYPE 2 DIABETES MELLITUS (HCC): ICD-10-CM

## 2025-01-13 NOTE — TELEPHONE ENCOUNTER
Please 90 day refill or however many as possible so patient can go on vacation, Please send to [facility]      rosuvastatin (CRESTOR) 20 MG tablet    amLODIPine (NORVASC) 5 MG tablet    bumetanide (BUMEX) 1 MG tablet    hydrALAZINE (APRESOLINE) 100 MG tablet    carvedilol (COREG) 25 MG tablet    insulin glargine (BASAGLAR KWIKPEN) 100 UNIT/ML injection pen     Semaglutide, 1 MG/DOSE, (OZEMPIC, 1 MG/DOSE,) 4 MG/3ML SOPN sc injection

## 2025-01-13 NOTE — TELEPHONE ENCOUNTER
Labs:   Lab Results   Component Value Date     11/26/2024    K 4.8 11/26/2024     11/26/2024    CO2 28 11/26/2024    BUN 34 (H) 11/26/2024    CREATININE 1.70 (H) 11/26/2024    GLUCOSE 134 (H) 11/26/2024    CALCIUM 9.2 11/26/2024    BILITOT 0.3 11/26/2024    ALKPHOS 58 11/26/2024    AST 21 11/26/2024    ALT 27 11/26/2024    LABGLOM 32 (L) 11/26/2024    AGRATIO 0.9 02/04/2023    GLOB 3.5 11/26/2024        Additional Notes:

## 2025-01-14 RX ORDER — HYDRALAZINE HYDROCHLORIDE 100 MG/1
100 TABLET, FILM COATED ORAL 2 TIMES DAILY
Qty: 180 TABLET | Refills: 1 | Status: SHIPPED | OUTPATIENT
Start: 2025-01-14

## 2025-01-14 RX ORDER — INSULIN GLARGINE 100 [IU]/ML
46 INJECTION, SOLUTION SUBCUTANEOUS EVERY MORNING
Qty: 15 ADJUSTABLE DOSE PRE-FILLED PEN SYRINGE | Refills: 11 | Status: SHIPPED | OUTPATIENT
Start: 2025-01-14

## 2025-01-14 RX ORDER — CARVEDILOL 25 MG/1
25 TABLET ORAL 2 TIMES DAILY
Qty: 180 TABLET | Refills: 1 | Status: SHIPPED | OUTPATIENT
Start: 2025-01-14

## 2025-01-14 RX ORDER — ROSUVASTATIN CALCIUM 20 MG/1
20 TABLET, COATED ORAL DAILY
Qty: 90 TABLET | Refills: 1 | Status: SHIPPED | OUTPATIENT
Start: 2025-01-14

## 2025-01-14 RX ORDER — SEMAGLUTIDE 1.34 MG/ML
1 INJECTION, SOLUTION SUBCUTANEOUS
Qty: 3 ML | Refills: 1 | Status: SHIPPED | OUTPATIENT
Start: 2025-01-14

## 2025-01-14 RX ORDER — AMLODIPINE BESYLATE 5 MG/1
5 TABLET ORAL DAILY
Qty: 90 TABLET | Refills: 1 | Status: SHIPPED | OUTPATIENT
Start: 2025-01-14

## 2025-01-14 RX ORDER — BUMETANIDE 1 MG/1
1 TABLET ORAL 2 TIMES DAILY
Qty: 90 TABLET | Refills: 1 | OUTPATIENT
Start: 2025-01-14

## 2025-01-28 ENCOUNTER — TELEPHONE (OUTPATIENT)
Facility: CLINIC | Age: 70
End: 2025-01-28

## 2025-01-28 NOTE — TELEPHONE ENCOUNTER
Daughter/Son called regarding appt. No answer, left detailed voicemail. Will cancel appt for today and keep appt on 2/28/25.

## 2025-04-10 DIAGNOSIS — E11.65 TYPE 2 DIABETES MELLITUS WITH HYPERGLYCEMIA, WITH LONG-TERM CURRENT USE OF INSULIN (HCC): ICD-10-CM

## 2025-04-10 DIAGNOSIS — Z79.4 TYPE 2 DIABETES MELLITUS WITH HYPERGLYCEMIA, WITH LONG-TERM CURRENT USE OF INSULIN (HCC): ICD-10-CM

## 2025-04-11 RX ORDER — INSULIN GLARGINE 100 [IU]/ML
INJECTION, SOLUTION SUBCUTANEOUS
Qty: 15 ML | Refills: 2 | Status: SHIPPED | OUTPATIENT
Start: 2025-04-11

## 2025-04-24 ENCOUNTER — TELEPHONE (OUTPATIENT)
Facility: CLINIC | Age: 70
End: 2025-04-24

## 2025-04-29 ENCOUNTER — OFFICE VISIT (OUTPATIENT)
Facility: CLINIC | Age: 70
End: 2025-04-29
Payer: MEDICARE

## 2025-04-29 ENCOUNTER — HOSPITAL ENCOUNTER (OUTPATIENT)
Facility: HOSPITAL | Age: 70
Setting detail: SPECIMEN
Discharge: HOME | End: 2025-05-02
Payer: MEDICARE

## 2025-04-29 VITALS
OXYGEN SATURATION: 100 % | SYSTOLIC BLOOD PRESSURE: 110 MMHG | WEIGHT: 214 LBS | BODY MASS INDEX: 41.79 KG/M2 | TEMPERATURE: 97.9 F | DIASTOLIC BLOOD PRESSURE: 67 MMHG | HEART RATE: 68 BPM | RESPIRATION RATE: 14 BRPM

## 2025-04-29 DIAGNOSIS — N18.32 HYPERTENSIVE HEART AND KIDNEY DISEASE WITH CHRONIC SYSTOLIC CONGESTIVE HEART FAILURE AND STAGE 3B CHRONIC KIDNEY DISEASE (HCC): ICD-10-CM

## 2025-04-29 DIAGNOSIS — R30.0 DYSURIA: ICD-10-CM

## 2025-04-29 DIAGNOSIS — I13.0 HYPERTENSIVE HEART AND KIDNEY DISEASE WITH CHRONIC SYSTOLIC CONGESTIVE HEART FAILURE AND STAGE 3B CHRONIC KIDNEY DISEASE (HCC): ICD-10-CM

## 2025-04-29 DIAGNOSIS — E03.9 HYPOTHYROIDISM, UNSPECIFIED TYPE: ICD-10-CM

## 2025-04-29 DIAGNOSIS — I50.32 CHRONIC HEART FAILURE WITH PRESERVED EJECTION FRACTION (HCC): Primary | ICD-10-CM

## 2025-04-29 DIAGNOSIS — N18.31 STAGE 3A CHRONIC KIDNEY DISEASE (HCC): ICD-10-CM

## 2025-04-29 DIAGNOSIS — I50.22 HYPERTENSIVE HEART AND KIDNEY DISEASE WITH CHRONIC SYSTOLIC CONGESTIVE HEART FAILURE AND STAGE 3B CHRONIC KIDNEY DISEASE (HCC): ICD-10-CM

## 2025-04-29 DIAGNOSIS — E11.69 HYPERLIPIDEMIA ASSOCIATED WITH TYPE 2 DIABETES MELLITUS (HCC): ICD-10-CM

## 2025-04-29 DIAGNOSIS — E78.5 HYPERLIPIDEMIA ASSOCIATED WITH TYPE 2 DIABETES MELLITUS (HCC): ICD-10-CM

## 2025-04-29 DIAGNOSIS — E11.65 TYPE 2 DIABETES MELLITUS WITH HYPERGLYCEMIA, WITHOUT LONG-TERM CURRENT USE OF INSULIN (HCC): ICD-10-CM

## 2025-04-29 LAB
BILIRUBIN, URINE, POC: NEGATIVE
BLOOD URINE, POC: NEGATIVE
GLUCOSE URINE, POC: ABNORMAL
KETONES, URINE, POC: NEGATIVE
LEUKOCYTE ESTERASE, URINE, POC: ABNORMAL
NITRITE, URINE, POC: NEGATIVE
PH, URINE, POC: 5.5 (ref 4.6–8)
PROTEIN,URINE, POC: NEGATIVE
SPECIFIC GRAVITY, URINE, POC: 1 (ref 1–1.03)
URINALYSIS CLARITY, POC: ABNORMAL
URINALYSIS COLOR, POC: ABNORMAL
UROBILINOGEN, POC: ABNORMAL MG/DL

## 2025-04-29 PROCEDURE — 3017F COLORECTAL CA SCREEN DOC REV: CPT

## 2025-04-29 PROCEDURE — G8427 DOCREV CUR MEDS BY ELIG CLIN: HCPCS

## 2025-04-29 PROCEDURE — G8417 CALC BMI ABV UP PARAM F/U: HCPCS

## 2025-04-29 PROCEDURE — 87086 URINE CULTURE/COLONY COUNT: CPT

## 2025-04-29 PROCEDURE — 2022F DILAT RTA XM EVC RTNOPTHY: CPT

## 2025-04-29 PROCEDURE — 1160F RVW MEDS BY RX/DR IN RCRD: CPT

## 2025-04-29 PROCEDURE — 1159F MED LIST DOCD IN RCRD: CPT

## 2025-04-29 PROCEDURE — G8399 PT W/DXA RESULTS DOCUMENT: HCPCS

## 2025-04-29 PROCEDURE — 1036F TOBACCO NON-USER: CPT

## 2025-04-29 PROCEDURE — 81001 URINALYSIS AUTO W/SCOPE: CPT

## 2025-04-29 PROCEDURE — 99214 OFFICE O/P EST MOD 30 MIN: CPT

## 2025-04-29 PROCEDURE — 1090F PRES/ABSN URINE INCON ASSESS: CPT

## 2025-04-29 PROCEDURE — 1123F ACP DISCUSS/DSCN MKR DOCD: CPT

## 2025-04-29 PROCEDURE — 3046F HEMOGLOBIN A1C LEVEL >9.0%: CPT

## 2025-04-29 RX ORDER — LEVOTHYROXINE SODIUM 25 UG/1
25 TABLET ORAL DAILY
COMMUNITY

## 2025-04-29 RX ORDER — SEMAGLUTIDE 1.34 MG/ML
1 INJECTION, SOLUTION SUBCUTANEOUS
Qty: 3 ML | Refills: 3 | Status: SHIPPED | OUTPATIENT
Start: 2025-04-29

## 2025-04-29 RX ORDER — SOLIFENACIN SUCCINATE 10 MG/1
5 TABLET, FILM COATED ORAL DAILY
COMMUNITY

## 2025-04-29 ASSESSMENT — ENCOUNTER SYMPTOMS
NAUSEA: 0
ABDOMINAL PAIN: 0
VOMITING: 0
CONSTIPATION: 0
SHORTNESS OF BREATH: 0
DIARRHEA: 0

## 2025-04-29 NOTE — ASSESSMENT & PLAN NOTE
Reviewed 4/10/25 cardiology notes, [facility]. Plan: decreased amlodipine to 5 daily d/t edema, increase carvedilol to 25 mg BID, continue bumex 1 mg BID, check STAT PVLs of LE d/t significant edema, continue carvedilol 12.5 mg BID, continue dosuvastatin, hydralazine discontinued by [facility] provider, and started on synthroid, restarted farxiga d/t edema  Appears compensated, +2 edema to BLE, PVL BLE- negative for DVT, continue to f/u with cardiology for management    Orders:    CBC with Auto Differential; Future

## 2025-04-29 NOTE — ASSESSMENT & PLAN NOTE
LDL 46 in November 2024, at goal of <70 d/t DM  Continue rosuvastatin 20 mg nightly, repeat lipid panel    Orders:    Comprehensive Metabolic Panel; Future    Lipid Panel; Future

## 2025-04-29 NOTE — ASSESSMENT & PLAN NOTE
Started on levthyroxine while hospitalized in [facility]  Check TSH and T4 levels    Orders:    TSH; Future    T4, Free; Future

## 2025-04-29 NOTE — ASSESSMENT & PLAN NOTE
A1c 8.2 in November 2024, continue semaglutide 1 mg weekly, insulin glargine 46 units daily, farxiga 10 mg daily (for heart failure)  PharmD notes from 12/4/24 reviewed, plan: continue insulin basaglar 46 units daily, increase ozempic to 1 mg weekly, continue farxiga 10 mg daily. Follow up, as scheduled, on 1/17/25.   Advised pt to follow up with pharmacy for assistance with diabetes management  Ordered repeat a1c    Orders:    Comprehensive Metabolic Panel; Future    Hemoglobin A1C; Future

## 2025-04-29 NOTE — ASSESSMENT & PLAN NOTE
BP at goal of <130/80, continue amlodipine 5 mg daily, carvedilol 25 mg BID, bumex 1 mg BID  Reviewed 4/18/25 Nephrology notes, with Jeanne Wray NP at [facility]. Plan: continue bumex 1 mg BID, Noted kidney function back to baseline, creatinine 1.78, holding ACE-I since OLEG, kidney US and renal PVL WNL  Follow up in 2-3 months    Orders:    CBC with Auto Differential; Future    Comprehensive Metabolic Panel; Future

## 2025-04-29 NOTE — PROGRESS NOTES
Joie Rivas presents today for   Chief Complaint   Patient presents with    Chronic heart failure    Hypertensive heart and kidney disease    Chronic Kidney Disease    Diabetes    Hyperlipidemia    Hypothyroidism         Is someone accompanying this pt? Granddaughter    Is the patient using any DME equipment during OV? no    Depression Screenin/27/2024     3:30 PM 10/29/2024     3:11 PM 2024     2:10 PM 3/8/2024    10:17 AM 2024    11:21 AM 7/10/2023     3:19 PM 2023    11:20 AM   PHQ-9 Questionaire   Little interest or pleasure in doing things 0 0 0 0 0 0 0   Feeling down, depressed, or hopeless 0 0 0 0 0 0 0   PHQ-9 Total Score 0 0 0 0 0 0 0        LIOR 7-Anxiety        No data to display                   Learning Assessment:  No question data found.     Fall Risk       No data to display                   Travel Screening:    Travel Screening     No screening recorded since 25 0000       Travel History   Travel since 25    No documented travel since 25            Health Maintenance reviewed and discussed and ordered per Provider.  Transportation Needs: Unknown (2024)    PRAPARE - Transportation     Lack of Transportation (Medical): Not on file     Lack of Transportation (Non-Medical): Patient declined      Food Insecurity: Patient Declined (2024)    Hunger Vital Sign     Worried About Running Out of Food in the Last Year: Patient declined     Ran Out of Food in the Last Year: Patient declined     Financial Resource Strain: Patient Declined (2024)    Overall Financial Resource Strain (CARDIA)     Difficulty of Paying Living Expenses: Patient declined     Housing Stability: Unknown (2024)    Housing Stability Vital Sign     Unable to Pay for Housing in the Last Year: Not on file     Number of Times Moved in the Last Year: Not on file     Homeless in the Last Year: Patient declined       Did you provide resources if patient requested them?

## 2025-04-29 NOTE — ASSESSMENT & PLAN NOTE
GFR 27, creatinine 1.9 on 4/14/25, obtained via [employer], continue to f/u with nephrology for management  Reviewed 4/18/25 Nephrology notes, with Jeanne Wray NP at [facility]. Plan: continue bumex 1 mg BID, Noted kidney function back to baseline, creatinine 1.78, holding ACE-I since OLEG, kidney US and renal PVL WNL  Follow up in 2-3 months

## 2025-04-29 NOTE — PROGRESS NOTES
Chief Complaint   Patient presents with    Chronic heart failure    Hypertensive heart and kidney disease    Chronic Kidney Disease    Diabetes    Hyperlipidemia    Hypothyroidism     Assessment & Plan  Chronic heart failure with preserved ejection fraction (HCC)  Reviewed 4/10/25 cardiology notes, [facility]. 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 Plan: continue bumex 1 mg BID, Noted kidney function back to baseline, creatinine 1.78, holding ACE-I since OLEG, kidney US and renal PVL WNL  Follow up in 2-3 months    Orders:    CBC with Auto Differential; Future    Comprehensive Metabolic Panel; Future    Stage 3a chronic kidney disease (HCC)  GFR 27, creatinine 1.9 on 4/14/25, obtained via [employer], continue to f/u with nephrology for management  Reviewed 4/18/25 Nephrology notes, with Jeanne Wray NP at [facility]. Plan: continue bumex 1 mg BID, Noted kidney function back to baseline, creatinine 1.78, holding ACE-I since OLEG, kidney US and renal PVL WNL  Follow up in 2-3 months         Type 2 diabetes mellitus with hyperglycemia, without long-term current use of insulin ([facility])  A1c 8.2 in November 2024, continue semaglutide 1 mg weekly,

## 2025-05-01 ENCOUNTER — RESULTS FOLLOW-UP (OUTPATIENT)
Facility: CLINIC | Age: 70
End: 2025-05-01

## 2025-05-01 LAB
BACTERIA SPEC CULT: NORMAL
SERVICE CMNT-IMP: NORMAL

## 2025-05-02 NOTE — TELEPHONE ENCOUNTER
Attempted to contact patient no answer. Number out of service. Will send a Joppel message if applicable.

## 2025-05-02 NOTE — TELEPHONE ENCOUNTER
----- Message from ANNIKA Hollingsworth sent at 5/1/2025 10:10 AM EDT -----  Please advise pt on the following:     Urine culture is negative for infection.   Follow up with urology for further evaluation of painful urination.     Thank you!

## 2025-05-05 DIAGNOSIS — E11.65 TYPE 2 DIABETES MELLITUS WITH HYPERGLYCEMIA, WITHOUT LONG-TERM CURRENT USE OF INSULIN (HCC): ICD-10-CM

## 2025-05-05 RX ORDER — SEMAGLUTIDE 1.34 MG/ML
1 INJECTION, SOLUTION SUBCUTANEOUS
Qty: 3 ML | Refills: 3 | Status: SHIPPED | OUTPATIENT
Start: 2025-05-05

## 2025-05-05 NOTE — TELEPHONE ENCOUNTER
Semaglutide, 1 MG/DOSE, (OZEMPIC, 1 MG/DOSE,) 4 MG/3ML SOPN sc injection    Patients daughter is calling because [facility] does not have ozempic any longer, caller is requesting meds be sent to [facility] in [place]

## 2025-05-07 ENCOUNTER — RESULTS FOLLOW-UP (OUTPATIENT)
Facility: CLINIC | Age: 70
End: 2025-05-07

## 2025-05-07 LAB
A/G RATIO: 1.1 RATIO (ref 1.1–2.6)
ALBUMIN: 3.9 G/DL (ref 3.5–5)
ALP BLD-CCNC: 74 U/L (ref 40–120)
ALT SERPL-CCNC: 18 U/L (ref 5–40)
ANION GAP SERPL CALCULATED.3IONS-SCNC: 8 MMOL/L (ref 3–15)
AST SERPL-CCNC: 22 U/L (ref 10–37)
BASOPHILS # BLD: 1 % (ref 0–2)
BASOPHILS ABSOLUTE: 0 K/UL (ref 0–0.2)
BILIRUB SERPL-MCNC: 0.2 MG/DL (ref 0.2–1.2)
BUN BLDV-MCNC: 46 MG/DL (ref 6–22)
CALCIUM SERPL-MCNC: 9.3 MG/DL (ref 8.4–10.5)
CHLORIDE BLD-SCNC: 102 MMOL/L (ref 98–110)
CHOLESTEROL, TOTAL: 125 MG/DL (ref 110–200)
CHOLESTEROL/HDL RATIO: 3.8 (ref 0–5)
CO2: 30 MMOL/L (ref 20–32)
CREAT SERPL-MCNC: 2.2 MG/DL (ref 0.8–1.4)
EOSINOPHIL # BLD: 6 % (ref 0–6)
EOSINOPHILS ABSOLUTE: 0.3 K/UL (ref 0–0.5)
ESTIMATED AVERAGE GLUCOSE: 272 MG/DL (ref 91–123)
GFR, ESTIMATED: 23.5 ML/MIN/1.73 SQ.M.
GLOBULIN: 3.4 G/DL (ref 2–4)
GLUCOSE: 182 MG/DL (ref 70–99)
HBA1C MFR BLD: 11.1 % (ref 4.8–5.6)
HCT VFR BLD CALC: 35.5 % (ref 35.1–48.3)
HDLC SERPL-MCNC: 33 MG/DL
HEMOGLOBIN: 10.9 G/DL (ref 11.7–16.1)
LDL CHOLESTEROL: 69 MG/DL (ref 50–99)
LDL/HDL RATIO: 2.1
LYMPHOCYTES # BLD: 39 % (ref 20–45)
LYMPHOCYTES ABSOLUTE: 2 K/UL (ref 1–4.8)
MCH RBC QN AUTO: 23 PG (ref 26–34)
MCHC RBC AUTO-ENTMCNC: 31 G/DL (ref 31–36)
MCV RBC AUTO: 76 FL (ref 80–99)
MONOCYTES ABSOLUTE: 0.5 K/UL (ref 0.1–1)
MONOCYTES: 9 % (ref 3–12)
NEUTROPHILS ABSOLUTE: 2.3 K/UL (ref 1.8–7.7)
NEUTROPHILS SEGMENTED: 45 % (ref 40–75)
NON-HDL CHOLESTEROL: 92 MG/DL
PDW BLD-RTO: 14.8 % (ref 10–15.5)
PLATELET # BLD: 180 K/UL (ref 140–440)
PMV BLD AUTO: 12 FL (ref 9–13)
POTASSIUM SERPL-SCNC: 4.9 MMOL/L (ref 3.5–5.5)
RBC # BLD: 4.69 M/UL (ref 3.8–5.2)
SODIUM BLD-SCNC: 140 MMOL/L (ref 133–145)
T4 FREE: 1.3 NG/DL (ref 0.9–1.8)
TOTAL PROTEIN: 7.3 G/DL (ref 6.2–8.1)
TRIGL SERPL-MCNC: 112 MG/DL (ref 40–149)
TSH SERPL DL<=0.05 MIU/L-ACNC: 3.63 MCU/ML (ref 0.27–4.2)
VLDLC SERPL CALC-MCNC: 22 MG/DL (ref 8–30)
WBC # BLD: 5.1 K/UL (ref 4–11)

## 2025-05-13 ENCOUNTER — OFFICE VISIT (OUTPATIENT)
Facility: CLINIC | Age: 70
End: 2025-05-13
Payer: MEDICARE

## 2025-05-13 ENCOUNTER — TELEPHONE (OUTPATIENT)
Facility: CLINIC | Age: 70
End: 2025-05-13

## 2025-05-13 VITALS
OXYGEN SATURATION: 96 % | BODY MASS INDEX: 41.43 KG/M2 | WEIGHT: 211 LBS | HEIGHT: 60 IN | TEMPERATURE: 97.6 F | SYSTOLIC BLOOD PRESSURE: 122 MMHG | DIASTOLIC BLOOD PRESSURE: 73 MMHG | RESPIRATION RATE: 14 BRPM | HEART RATE: 68 BPM

## 2025-05-13 DIAGNOSIS — E78.5 HYPERLIPIDEMIA ASSOCIATED WITH TYPE 2 DIABETES MELLITUS (HCC): Primary | ICD-10-CM

## 2025-05-13 DIAGNOSIS — E11.69 HYPERLIPIDEMIA ASSOCIATED WITH TYPE 2 DIABETES MELLITUS (HCC): Primary | ICD-10-CM

## 2025-05-13 DIAGNOSIS — N18.4 STAGE 4 CHRONIC KIDNEY DISEASE (HCC): ICD-10-CM

## 2025-05-13 DIAGNOSIS — I65.23 BILATERAL CAROTID ARTERY OCCLUSION: ICD-10-CM

## 2025-05-13 DIAGNOSIS — D63.1 ANEMIA DUE TO STAGE 4 CHRONIC KIDNEY DISEASE (HCC): ICD-10-CM

## 2025-05-13 DIAGNOSIS — E03.9 HYPOTHYROIDISM, UNSPECIFIED TYPE: ICD-10-CM

## 2025-05-13 DIAGNOSIS — Z12.31 ENCOUNTER FOR SCREENING MAMMOGRAM FOR BREAST CANCER: ICD-10-CM

## 2025-05-13 DIAGNOSIS — Z71.2 ENCOUNTER TO DISCUSS TEST RESULTS: ICD-10-CM

## 2025-05-13 DIAGNOSIS — E11.65 TYPE 2 DIABETES MELLITUS WITH HYPERGLYCEMIA, WITHOUT LONG-TERM CURRENT USE OF INSULIN (HCC): ICD-10-CM

## 2025-05-13 DIAGNOSIS — I13.0 HYPERTENSIVE HEART AND KIDNEY DISEASE WITH CHRONIC SYSTOLIC CONGESTIVE HEART FAILURE AND STAGE 3B CHRONIC KIDNEY DISEASE (HCC): ICD-10-CM

## 2025-05-13 DIAGNOSIS — N18.4 ANEMIA DUE TO STAGE 4 CHRONIC KIDNEY DISEASE (HCC): ICD-10-CM

## 2025-05-13 DIAGNOSIS — I50.22 HYPERTENSIVE HEART AND KIDNEY DISEASE WITH CHRONIC SYSTOLIC CONGESTIVE HEART FAILURE AND STAGE 3B CHRONIC KIDNEY DISEASE (HCC): ICD-10-CM

## 2025-05-13 DIAGNOSIS — N18.32 HYPERTENSIVE HEART AND KIDNEY DISEASE WITH CHRONIC SYSTOLIC CONGESTIVE HEART FAILURE AND STAGE 3B CHRONIC KIDNEY DISEASE (HCC): ICD-10-CM

## 2025-05-13 PROCEDURE — 3017F COLORECTAL CA SCREEN DOC REV: CPT

## 2025-05-13 PROCEDURE — G8427 DOCREV CUR MEDS BY ELIG CLIN: HCPCS

## 2025-05-13 PROCEDURE — 1159F MED LIST DOCD IN RCRD: CPT

## 2025-05-13 PROCEDURE — 2022F DILAT RTA XM EVC RTNOPTHY: CPT

## 2025-05-13 PROCEDURE — 1160F RVW MEDS BY RX/DR IN RCRD: CPT

## 2025-05-13 PROCEDURE — G8399 PT W/DXA RESULTS DOCUMENT: HCPCS

## 2025-05-13 PROCEDURE — 1123F ACP DISCUSS/DSCN MKR DOCD: CPT

## 2025-05-13 PROCEDURE — 3046F HEMOGLOBIN A1C LEVEL >9.0%: CPT

## 2025-05-13 PROCEDURE — 99214 OFFICE O/P EST MOD 30 MIN: CPT

## 2025-05-13 PROCEDURE — 1036F TOBACCO NON-USER: CPT

## 2025-05-13 PROCEDURE — G8417 CALC BMI ABV UP PARAM F/U: HCPCS

## 2025-05-13 PROCEDURE — 1090F PRES/ABSN URINE INCON ASSESS: CPT

## 2025-05-13 RX ORDER — FERROUS SULFATE 325(65) MG
325 TABLET ORAL
Qty: 90 TABLET | Refills: 1 | Status: SHIPPED | OUTPATIENT
Start: 2025-05-13

## 2025-05-13 RX ORDER — LEVOTHYROXINE SODIUM 25 UG/1
25 TABLET ORAL DAILY
Qty: 90 TABLET | Refills: 1 | Status: SHIPPED | OUTPATIENT
Start: 2025-05-13

## 2025-05-13 SDOH — ECONOMIC STABILITY: FOOD INSECURITY: WITHIN THE PAST 12 MONTHS, YOU WORRIED THAT YOUR FOOD WOULD RUN OUT BEFORE YOU GOT MONEY TO BUY MORE.: NEVER TRUE

## 2025-05-13 SDOH — ECONOMIC STABILITY: FOOD INSECURITY: WITHIN THE PAST 12 MONTHS, THE FOOD YOU BOUGHT JUST DIDN'T LAST AND YOU DIDN'T HAVE MONEY TO GET MORE.: NEVER TRUE

## 2025-05-13 ASSESSMENT — PATIENT HEALTH QUESTIONNAIRE - PHQ9
SUM OF ALL RESPONSES TO PHQ QUESTIONS 1-9: 0
1. LITTLE INTEREST OR PLEASURE IN DOING THINGS: NOT AT ALL
2. FEELING DOWN, DEPRESSED OR HOPELESS: NOT AT ALL
SUM OF ALL RESPONSES TO PHQ QUESTIONS 1-9: 0

## 2025-05-13 ASSESSMENT — ENCOUNTER SYMPTOMS: SHORTNESS OF BREATH: 0

## 2025-05-13 NOTE — ASSESSMENT & PLAN NOTE
Worsening, GFR decreased from 32 to 23.5, likely r/t uncontrolled DM  Per daughter, last ozempic dose taken two months ago  Advised pt to continue to avoid NSAIDs and increase hydration  Continue to f/u with nephrology for management  Reviewed 4/18/25 Nephrology notes, with Jeanne Wray NP at [facility]. Plan: continue bumex 1 mg BID, Noted kidney function back to baseline, creatinine 1.78, holding ACE-I since OLEG, kidney US and renal PVL WNL  Follow up in 2-3 months    Orders:    CBC with Auto Differential; Future

## 2025-05-13 NOTE — PROGRESS NOTES
Joie Rivas presents today for   Chief Complaint   Patient presents with    HCC    Hyperlipidemia    Chronic Kidney Disease    Diabetes    Bilateral carotid artery occlusion    Hypothyroidism    Anemia    Discuss Labs       HPI     Is someone accompanying this pt? Son    Is the patient using any DME equipment during OV? no    Depression Screenin/27/2024     3:30 PM 10/29/2024     3:11 PM 2024     2:10 PM 3/8/2024    10:17 AM 2024    11:21 AM 7/10/2023     3:19 PM 2023    11:20 AM   PHQ-9 Questionaire   Little interest or pleasure in doing things 0 0 0 0 0 0 0   Feeling down, depressed, or hopeless 0 0 0 0 0 0 0   PHQ-9 Total Score 0 0 0 0 0 0 0        LIOR 7-Anxiety        No data to display                   Learning Assessment:  No question data found.     Fall Risk       No data to display                   Travel Screening:    Travel Screening     No screening recorded since 25 0000       Travel History   Travel since 25    No documented travel since 25            Health Maintenance reviewed and discussed and ordered per Provider.  Transportation Needs: Unknown (2024)    PRAPARE - Transportation     Lack of Transportation (Medical): Not on file     Lack of Transportation (Non-Medical): Patient declined      Food Insecurity: Patient Declined (2024)    Hunger Vital Sign     Worried About Running Out of Food in the Last Year: Patient declined     Ran Out of Food in the Last Year: Patient declined     Financial Resource Strain: Patient Declined (2024)    Overall Financial Resource Strain (CARDIA)     Difficulty of Paying Living Expenses: Patient declined     Housing Stability: Unknown (2024)    Housing Stability Vital Sign     Unable to Pay for Housing in the Last Year: Not on file     Number of Times Moved in the Last Year: Not on file     Homeless in the Last Year: Patient declined       Did you provide resources if patient requested them?

## 2025-05-13 NOTE — TELEPHONE ENCOUNTER
Please call daughter, Krunal Lorenzo, and advise for pt to restart ozempic at 0.25 mg weekly for 4 weeks, then increase to 0.5 mg weekly thereafter.   I sent the new ozempic prescription to [facility].     Thank you!

## 2025-05-13 NOTE — ASSESSMENT & PLAN NOTE
TSH and T4 levels WNL, continue levothyroxine 25 mcg daily  Repeat TSH and T4 levels in 6 months    Orders:    levothyroxine (SYNTHROID) 25 MCG tablet; Take 1 tablet by mouth Daily

## 2025-05-13 NOTE — ASSESSMENT & PLAN NOTE
BP at goal of <130/80, continue amlodipine 5 mg daily, carvedilol 25 mg BID, bumex 1 mg BID  Reviewed 4/18/25 Nephrology notes, with Jeanne Wray NP at [facility]. Plan: continue bumex 1 mg BID, Noted kidney function back to baseline, creatinine 1.78, holding ACE-I since OLEG, kidney US and renal PVL WNL  Follow up in 2-3 months    Orders:    CBC with Auto Differential; Future     daily consumption of a bottle of rum  - no hx of withdrawal seizures, hallucinations, DTs or hospitalizations  - start on ativan taper regimen  - CIWA  - start on multivitamin, folate, thiamine

## 2025-05-13 NOTE — ASSESSMENT & PLAN NOTE
Worsening, A1c increased from 8.2 to 11.1  Daughter reports last dose of ozempic was approximately two months ago d/t syringe [place]  Restart ozempic 0.25 mg weekly x4 weeks, then increase to 0.5 mg weekly thereafter  Continue insulin glargine 46 units every AM, and farxiga 10 mg daily  Advised pt on lifestyle modifications  Follow up with pharmacist, as scheduled, on 6/4/25, for assistance with diabetes management  Repeat A1c in 3 months      Orders:    CBC with Auto Differential; Future    Semaglutide,0.25 or 0.5MG/DOS, 2 MG/3ML SOPN; Inject 0.25 mg into the skin once a week for 28 days, THEN 0.5 mg once a week for 28 days.

## 2025-05-13 NOTE — PROGRESS NOTES
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 Plan: continue bumex 1 mg BID, Noted kidney function back to baseline, creatinine 1.78, holding ACE-I since OLEG, kidney US and renal PVL WNL  Follow up in 2-3 months    Orders:    CBC with Auto Differential; Future    Hyperlipidemia associated with type 2 diabetes mellitus (HCC)  LDL 69, at goal of <70 d/t DM  Continue rosuvastatin 20 mg nightly and lifestyle modifications  Repeat lipid panel in 6 months    Orders:    CBC with Auto Differential; Future    Comprehensive Metabolic Panel; Future    Hemoglobin A1C; Future    Stage 4 chronic kidney disease (HCC)  Worsening, GFR decreased from 32 to 23.5, likely r/t uncontrolled DM  Per daughter, last ozempic dose taken two months ago  Advised pt to continue to avoid NSAIDs and increase hydration  Continue to f/u with nephrology for management  Reviewed 4/18/25 Nephrology notes, with Jeanne Wray NP at [facility]. Plan: continue bumex 1 mg BID, Noted kidney function back to baseline, creatinine 1.78, holding ACE-I since OLEG, kidney US and renal PVL WNL  Follow up in 2-3 months    Orders:    CBC with Auto Differential; Future    Type 2 diabetes mellitus with hyperglycemia, without long-term current use of insulin (HCC)  Worsening, A1c increased from 8.2 to 11.1  Daughter reports last dose of ozempic was approximately two months ago d/t syringe malfunction [place]  Restart ozempic 0.25 mg weekly x4 weeks, then

## 2025-05-13 NOTE — ASSESSMENT & PLAN NOTE
Symptomatic, reports intermittent dizziness  Bilateral carotid artery duplex ordered on 12/27/24, not completed- advised to complete

## 2025-05-13 NOTE — ASSESSMENT & PLAN NOTE
Worsening, start ferrous sulfate daily, likely d/t worsening kidney function, uncontrolled DM  Repeat cbc in 3 months    Orders:    ferrous sulfate (IRON 325) 325 (65 Fe) MG tablet; Take 1 tablet by mouth daily (with breakfast)

## 2025-05-13 NOTE — ASSESSMENT & PLAN NOTE
LDL 69, at goal of <70 d/t DM  Continue rosuvastatin 20 mg nightly and lifestyle modifications  Repeat lipid panel in 6 months    Orders:    CBC with Auto Differential; Future    Comprehensive Metabolic Panel; Future    Hemoglobin A1C; Future

## 2025-05-21 DIAGNOSIS — E11.65 TYPE 2 DIABETES MELLITUS WITH HYPERGLYCEMIA, WITH LONG-TERM CURRENT USE OF INSULIN (HCC): ICD-10-CM

## 2025-05-21 DIAGNOSIS — Z79.4 TYPE 2 DIABETES MELLITUS WITH HYPERGLYCEMIA, WITH LONG-TERM CURRENT USE OF INSULIN (HCC): ICD-10-CM

## 2025-05-21 NOTE — TELEPHONE ENCOUNTER
Caller states [facility] no longer carries this medication, please send to [facility]    insulin glargine (BASAGLAR KWIKPEN) 100 UNIT/ML injection pen [[id number]]

## 2025-05-23 RX ORDER — INSULIN GLARGINE-YFGN 100 [IU]/ML
46 INJECTION, SOLUTION SUBCUTANEOUS EVERY MORNING
Qty: 15 ML | Refills: 3 | Status: SHIPPED | OUTPATIENT
Start: 2025-05-23

## 2025-05-23 RX ORDER — INSULIN GLARGINE 100 [IU]/ML
INJECTION, SOLUTION SUBCUTANEOUS EVERY MORNING
Qty: 15 ML | Refills: 2 | Status: CANCELLED | OUTPATIENT
Start: 2025-05-23

## 2025-05-23 NOTE — TELEPHONE ENCOUNTER
Labs:   Lab Results   Component Value Date     05/07/2025    K 4.9 05/07/2025     05/07/2025    CO2 30 05/07/2025    BUN 46 (H) 05/07/2025    CREATININE 2.2 (H) 05/07/2025    GLUCOSE 182 (H) 05/07/2025    CALCIUM 9.3 05/07/2025    BILITOT 0.2 05/07/2025    ALKPHOS 74 05/07/2025    AST 22 05/07/2025    ALT 18 05/07/2025    LABGLOM 23.5 (L) 05/07/2025    AGRATIO 1.1 05/07/2025    GLOB 3.4 05/07/2025        Additional Notes:

## 2025-06-10 ENCOUNTER — HOSPITAL ENCOUNTER (OUTPATIENT)
Age: 70
Discharge: HOME | End: 2025-06-12
Payer: MEDICARE

## 2025-06-10 DIAGNOSIS — I65.23 BILATERAL CAROTID ARTERY OCCLUSION: ICD-10-CM

## 2025-06-10 PROCEDURE — 93880 EXTRACRANIAL BILAT STUDY: CPT

## 2025-06-12 LAB
VAS LEFT CCA DIST EDV: 16.5 CM/S
VAS LEFT CCA DIST PSV: 76.2 CM/S
VAS LEFT CCA MID EDV: 18.09 CM/S
VAS LEFT CCA MID PSV: 79.47 CM/S
VAS LEFT CCA PROX EDV: 14.4 CM/S
VAS LEFT CCA PROX PSV: 81.8 CM/S
VAS LEFT ECA EDV: 11.63 CM/S
VAS LEFT ECA PSV: 96.4 CM/S
VAS LEFT ICA DIST EDV: 20.9 CM/S
VAS LEFT ICA DIST PSV: 82.5 CM/S
VAS LEFT ICA MID EDV: 13.6 CM/S
VAS LEFT ICA MID PSV: 70.3 CM/S
VAS LEFT ICA PROX EDV: 16.7 CM/S
VAS LEFT ICA PROX PSV: 80.1 CM/S
VAS LEFT ICA/CCA PSV: 1.08 NO UNITS
VAS LEFT SUBCLAVIAN PROX EDV: 0 CM/S
VAS LEFT SUBCLAVIAN PROX PSV: 121 CM/S
VAS LEFT VERTEBRAL EDV: 9.78 CM/S
VAS LEFT VERTEBRAL PSV: 42.8 CM/S
VAS RIGHT CCA DIST EDV: 11.5 CM/S
VAS RIGHT CCA DIST PSV: 62 CM/S
VAS RIGHT CCA MID EDV: 14.11 CM/S
VAS RIGHT CCA MID PSV: 61.99 CM/S
VAS RIGHT CCA PROX EDV: 11.5 CM/S
VAS RIGHT CCA PROX PSV: 65.9 CM/S
VAS RIGHT ECA EDV: 7.67 CM/S
VAS RIGHT ECA PSV: 96.9 CM/S
VAS RIGHT ICA DIST EDV: 21.2 CM/S
VAS RIGHT ICA DIST PSV: 76.5 CM/S
VAS RIGHT ICA MID EDV: 20.6 CM/S
VAS RIGHT ICA MID PSV: 81.4 CM/S
VAS RIGHT ICA PROX EDV: 13.2 CM/S
VAS RIGHT ICA PROX PSV: 53.3 CM/S
VAS RIGHT ICA/CCA PSV: 1.3 NO UNITS
VAS RIGHT SUBCLAVIAN PROX EDV: 0 CM/S
VAS RIGHT SUBCLAVIAN PROX PSV: 125.9 CM/S
VAS RIGHT VERTEBRAL EDV: 19.22 CM/S
VAS RIGHT VERTEBRAL PSV: 70.9 CM/S

## 2025-06-12 PROCEDURE — 93880 EXTRACRANIAL BILAT STUDY: CPT | Performed by: SURGERY

## 2025-06-16 ENCOUNTER — RESULTS FOLLOW-UP (OUTPATIENT)
Facility: CLINIC | Age: 70
End: 2025-06-16

## 2025-06-16 ENCOUNTER — TELEPHONE (OUTPATIENT)
Facility: CLINIC | Age: 70
End: 2025-06-16

## 2025-06-23 DIAGNOSIS — I10 ESSENTIAL HYPERTENSION: ICD-10-CM

## 2025-06-23 NOTE — TELEPHONE ENCOUNTER
Patient is requesting (amLODIPine (NORVASC) 5 MG tablet ) and (bumetanide (BUMEX) 1 MG tablet ) be sent to [id number] [address] - P [phone] - F [phone] [[id number]]

## 2025-06-24 RX ORDER — BUMETANIDE 1 MG/1
1 TABLET ORAL 2 TIMES DAILY
Qty: 90 TABLET | Refills: 1 | OUTPATIENT
Start: 2025-06-24

## 2025-06-24 RX ORDER — AMLODIPINE BESYLATE 5 MG/1
5 TABLET ORAL DAILY
Qty: 90 TABLET | Refills: 1 | Status: SHIPPED | OUTPATIENT
Start: 2025-06-24

## 2025-07-01 ENCOUNTER — HOSPITAL ENCOUNTER (OUTPATIENT)
Facility: HOSPITAL | Age: 70
Discharge: HOME | End: 2025-07-04
Payer: MEDICARE

## 2025-07-01 VITALS — BODY MASS INDEX: 41.43 KG/M2 | HEIGHT: 60 IN | WEIGHT: 211 LBS

## 2025-07-01 DIAGNOSIS — Z12.31 ENCOUNTER FOR SCREENING MAMMOGRAM FOR BREAST CANCER: ICD-10-CM

## 2025-07-01 PROCEDURE — 77063 BREAST TOMOSYNTHESIS BI: CPT

## 2025-07-06 NOTE — ASSESSMENT & PLAN NOTE
BP at goal of <130/80, continue amlodipine 5 mg daily, carvedilol 25 mg BID, bumex 1 mg BID (for CHF)    Orders:    FARXIGA 10 MG tablet; Take 1 tablet by mouth daily    amLODIPine (NORVASC) 5 MG tablet; Take 1 tablet by mouth daily    carvedilol (COREG) 25 MG tablet; Take 1 tablet by mouth 2 times daily

## 2025-07-07 ENCOUNTER — OFFICE VISIT (OUTPATIENT)
Facility: CLINIC | Age: 70
End: 2025-07-07

## 2025-07-07 VITALS
BODY MASS INDEX: 35.32 KG/M2 | DIASTOLIC BLOOD PRESSURE: 72 MMHG | SYSTOLIC BLOOD PRESSURE: 118 MMHG | RESPIRATION RATE: 14 BRPM | HEART RATE: 72 BPM | OXYGEN SATURATION: 95 % | TEMPERATURE: 98 F | HEIGHT: 65 IN | WEIGHT: 212 LBS

## 2025-07-07 DIAGNOSIS — M17.0 BILATERAL PRIMARY OSTEOARTHRITIS OF KNEE: ICD-10-CM

## 2025-07-07 DIAGNOSIS — E11.65 TYPE 2 DIABETES MELLITUS WITH HYPERGLYCEMIA, WITHOUT LONG-TERM CURRENT USE OF INSULIN (HCC): ICD-10-CM

## 2025-07-07 DIAGNOSIS — E78.5 HYPERLIPIDEMIA ASSOCIATED WITH TYPE 2 DIABETES MELLITUS (HCC): ICD-10-CM

## 2025-07-07 DIAGNOSIS — E53.8 VITAMIN B12 DEFICIENCY: ICD-10-CM

## 2025-07-07 DIAGNOSIS — E03.9 HYPOTHYROIDISM, UNSPECIFIED TYPE: ICD-10-CM

## 2025-07-07 DIAGNOSIS — N18.32 HYPERTENSIVE HEART AND KIDNEY DISEASE WITH CHRONIC SYSTOLIC CONGESTIVE HEART FAILURE AND STAGE 3B CHRONIC KIDNEY DISEASE (HCC): ICD-10-CM

## 2025-07-07 DIAGNOSIS — Z00.00 MEDICARE ANNUAL WELLNESS VISIT, SUBSEQUENT: Primary | ICD-10-CM

## 2025-07-07 DIAGNOSIS — H25.9 AGE-RELATED CATARACT OF BOTH EYES, UNSPECIFIED AGE-RELATED CATARACT TYPE: ICD-10-CM

## 2025-07-07 DIAGNOSIS — I50.22 HYPERTENSIVE HEART AND KIDNEY DISEASE WITH CHRONIC SYSTOLIC CONGESTIVE HEART FAILURE AND STAGE 3B CHRONIC KIDNEY DISEASE (HCC): ICD-10-CM

## 2025-07-07 DIAGNOSIS — N32.81 OVERACTIVE BLADDER: ICD-10-CM

## 2025-07-07 DIAGNOSIS — E11.39 DIABETIC OCULOPATHY (HCC): ICD-10-CM

## 2025-07-07 DIAGNOSIS — N18.4 ANEMIA DUE TO STAGE 4 CHRONIC KIDNEY DISEASE (HCC): ICD-10-CM

## 2025-07-07 DIAGNOSIS — I65.23 BILATERAL CAROTID ARTERY OCCLUSION: ICD-10-CM

## 2025-07-07 DIAGNOSIS — H35.033 HYPERTENSIVE RETINOPATHY OF BOTH EYES: ICD-10-CM

## 2025-07-07 DIAGNOSIS — H81.13 BENIGN PAROXYSMAL VERTIGO, BILATERAL: ICD-10-CM

## 2025-07-07 DIAGNOSIS — E11.69 HYPERLIPIDEMIA ASSOCIATED WITH TYPE 2 DIABETES MELLITUS (HCC): ICD-10-CM

## 2025-07-07 DIAGNOSIS — E55.9 VITAMIN D DEFICIENCY: ICD-10-CM

## 2025-07-07 DIAGNOSIS — D63.1 ANEMIA DUE TO STAGE 4 CHRONIC KIDNEY DISEASE (HCC): ICD-10-CM

## 2025-07-07 DIAGNOSIS — N18.31 STAGE 3A CHRONIC KIDNEY DISEASE (HCC): ICD-10-CM

## 2025-07-07 DIAGNOSIS — Z86.73 HISTORY OF TIA (TRANSIENT ISCHEMIC ATTACK): ICD-10-CM

## 2025-07-07 DIAGNOSIS — I13.0 HYPERTENSIVE HEART AND KIDNEY DISEASE WITH CHRONIC SYSTOLIC CONGESTIVE HEART FAILURE AND STAGE 3B CHRONIC KIDNEY DISEASE (HCC): ICD-10-CM

## 2025-07-07 DIAGNOSIS — Z71.89 ACP (ADVANCE CARE PLANNING): ICD-10-CM

## 2025-07-07 DIAGNOSIS — I50.32 CHRONIC HEART FAILURE WITH PRESERVED EJECTION FRACTION (HCC): ICD-10-CM

## 2025-07-07 DIAGNOSIS — E11.3293 MILD NONPROLIFERATIVE DIABETIC RETINOPATHY OF BOTH EYES WITHOUT MACULAR EDEMA ASSOCIATED WITH TYPE 2 DIABETES MELLITUS (HCC): ICD-10-CM

## 2025-07-07 DIAGNOSIS — E11.42 POLYNEUROPATHY DUE TO TYPE 2 DIABETES MELLITUS (HCC): Chronic | ICD-10-CM

## 2025-07-07 RX ORDER — CARVEDILOL 25 MG/1
25 TABLET ORAL 2 TIMES DAILY
Qty: 180 TABLET | Refills: 1 | Status: SHIPPED | OUTPATIENT
Start: 2025-07-07

## 2025-07-07 RX ORDER — ACYCLOVIR 400 MG/1
TABLET ORAL
Qty: 3 EACH | Refills: 3 | Status: SHIPPED | OUTPATIENT
Start: 2025-07-07 | End: 2025-07-07

## 2025-07-07 RX ORDER — ROSUVASTATIN CALCIUM 10 MG/1
10 TABLET, COATED ORAL DAILY
Qty: 90 TABLET | Refills: 1 | Status: SHIPPED | OUTPATIENT
Start: 2025-07-07

## 2025-07-07 RX ORDER — ACYCLOVIR 400 MG/1
TABLET ORAL
Qty: 3 EACH | Refills: 3 | Status: SHIPPED | OUTPATIENT
Start: 2025-07-07

## 2025-07-07 RX ORDER — ACYCLOVIR 400 MG/1
TABLET ORAL
Qty: 1 EACH | Refills: 0 | Status: SHIPPED | OUTPATIENT
Start: 2025-07-07

## 2025-07-07 RX ORDER — DAPAGLIFLOZIN 10 MG/1
10 TABLET, FILM COATED ORAL DAILY
Qty: 90 TABLET | Refills: 1 | Status: SHIPPED | OUTPATIENT
Start: 2025-07-07

## 2025-07-07 RX ORDER — AMLODIPINE BESYLATE 5 MG/1
5 TABLET ORAL DAILY
Qty: 90 TABLET | Refills: 1 | Status: SHIPPED | OUTPATIENT
Start: 2025-07-07

## 2025-07-07 SDOH — ECONOMIC STABILITY: FOOD INSECURITY: WITHIN THE PAST 12 MONTHS, THE FOOD YOU BOUGHT JUST DIDN'T LAST AND YOU DIDN'T HAVE MONEY TO GET MORE.: PATIENT DECLINED

## 2025-07-07 SDOH — ECONOMIC STABILITY: FOOD INSECURITY: WITHIN THE PAST 12 MONTHS, YOU WORRIED THAT YOUR FOOD WOULD RUN OUT BEFORE YOU GOT MONEY TO BUY MORE.: PATIENT DECLINED

## 2025-07-07 ASSESSMENT — LIFESTYLE VARIABLES
HOW OFTEN DO YOU HAVE A DRINK CONTAINING ALCOHOL: NEVER
HOW MANY STANDARD DRINKS CONTAINING ALCOHOL DO YOU HAVE ON A TYPICAL DAY: PATIENT DOES NOT DRINK

## 2025-07-07 ASSESSMENT — PATIENT HEALTH QUESTIONNAIRE - PHQ9
SUM OF ALL RESPONSES TO PHQ QUESTIONS 1-9: 0
1. LITTLE INTEREST OR PLEASURE IN DOING THINGS: NOT AT ALL
SUM OF ALL RESPONSES TO PHQ QUESTIONS 1-9: 0
2. FEELING DOWN, DEPRESSED OR HOPELESS: NOT AT ALL
SUM OF ALL RESPONSES TO PHQ QUESTIONS 1-9: 0
SUM OF ALL RESPONSES TO PHQ QUESTIONS 1-9: 0

## 2025-07-07 ASSESSMENT — ENCOUNTER SYMPTOMS: SHORTNESS OF BREATH: 0

## 2025-07-07 NOTE — ASSESSMENT & PLAN NOTE
Awaiting clearance for cataract surgery  Previously unable to clear d/t uncontrolled DM, A1c 11.1 in May  Continue lifestyle modifications  Repeat A1c due in 6 weeks

## 2025-07-07 NOTE — PROGRESS NOTES
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 Plan: decreased amlodipine to 5 daily d/t edema, increase carvedilol to 25 mg BID, continue bumex 1 mg BID, check STAT PVLs of LE d/t significant edema, continue carvedilol 12.5 mg BID, continue dosuvastatin, hydralazine discontinued by [facility] provider, and started on synthroid, restarted farxiga d/t edema  Appears compensated, +2 edema to BLE, PVL BLE- negative for DVT, continue to f/u with cardiology for management    Orders:    FARXIGA 10 MG tablet; Take 1 tablet by mouth daily    Hyperlipidemia associated with type 2 diabetes mellitus (HCC)  LDL 69 in May, continue rosuvastatin 10 mg nightly  Repeat A1c due in November    Orders:    rosuvastatin (CRESTOR) 10 MG tablet; Take 1 tablet by mouth daily    Type 2 diabetes mellitus with hyperglycemia, without long-term current use of insulin ([facility])  A1c 11.1 in May, continue ozempic 0.5 mg weekly and insulin glargine 46 units daily  Advised pt on lifestyle modifications  Continue to f/u with pharmacy for assistance with management  Ordered dexcom per daughter's request    Orders:    Continuous Glucose  (DEXCOM G7 ) SHANICE; Use as directed    Continuous Glucose Sensor (DEXCOM G7 SENSOR) MISC; Apply 1 sensor every 10 days. Use as 
Advance Care Planning     Advance Care Planning (ACP) Physician/NP/PA Conversation    Date of Conversation: 7/7/2025  Conducted with: Patient with Decision Making Capacity  Other persons present: Son      Healthcare Decision Maker:   Primary Decision Maker: Geremias Lorenzo - Child - [phone]    Primary Decision Maker: Krunal Lorenzo - Child - [phone]     Today we documented Decision Maker(s) consistent with Legal Next of Kin hierarchy.    Care Preferences:    Hospitalization:  \"If your health worsens and it becomes clear that your chance of recovery is unlikely, what would be your preference regarding hospitalization?\"  The patient is unsure.    Ventilation:  \"If you were unable to breath on your own and your chance of recovery was unlikely, what would be your preference about the use of a ventilator (breathing machine) if it was available to you?\"  The patient would desire the use of a ventilator.    Resuscitation:  \"In the event your heart stopped as a result of an underlying serious health condition, would you want attempts made to restart your heart, or would you prefer a natural death?\"  Yes, attempt to resuscitate.    Conversation Outcomes / Follow-Up Plan:  ACP in process - information provided, considering goals and options    Length of Voluntary ACP Conversation in minutes:  <16 minutes (Non-Billable)    ANNIKA Berry        
   Pork Allergy Other (See Comments)     Patient is Anglican, no PORK     Prior to Visit Medications    Medication Sig Taking? Authorizing Provider   FARXIGA 10 MG tablet Take 1 tablet by mouth daily Yes Cheryl Puga NP-C   rosuvastatin (CRESTOR) 10 MG tablet Take 1 tablet by mouth daily Yes Cheryl Puga NP-C   amLODIPine (NORVASC) 5 MG tablet Take 1 tablet by mouth daily Yes Cheryl Puga NP-C   carvedilol (COREG) 25 MG tablet Take 1 tablet by mouth 2 times daily Yes Cheryl Puga NP-C   Continuous Glucose  (DEXCOM G7 ) SHANICE Use as directed Yes Cheryl Puga NP-C   Continuous Glucose Sensor (DEXCOM G7 SENSOR) MISC Apply 1 sensor every 10 days. Use as directed. Yes Cheryl Puga NP-C   Insulin Glargine-yfgn 100 UNIT/ML SOPN Inject 46 Units into the skin every morning Indications: Diabetes Yes Cheryl Puga NP-C   ferrous sulfate (IRON 325) 325 (65 Fe) MG tablet Take 1 tablet by mouth daily (with breakfast) Yes Cheryl Puga NP-C   levothyroxine (SYNTHROID) 25 MCG tablet Take 1 tablet by mouth Daily Yes Cheryl Puga NP-C   Semaglutide,0.25 or 0.5MG/DOS, 2 MG/3ML SOPN Inject 0.25 mg into the skin once a week for 28 days, THEN 0.5 mg once a week for 28 days. Yes Cheryl Puga NP-C   solifenacin (VESICARE) 10 MG tablet Take 0.5 tablets by mouth daily Yes Julia Rueda MD   Insulin Pen Needle 32G X 4 MM MISC 1 each by Does not apply route daily Yes Cheryl Puga NP-C   ASPIRIN LOW DOSE 81 MG EC tablet take 1 tablet by mouth once daily Yes Cheryl Puga NP-C   docusate sodium (COLACE) 100 MG capsule Take 1 capsule by mouth daily as needed for Constipation Yes Cheryl Puga NP-C   Blood Glucose Monitoring Suppl (TRUE METRIX AIR GLUCOSE METER) SHANICE by Does not apply route Yes Marybeth MD Julia   Blood Glucose Monitoring Suppl (TRUE METRIX AIR GLUCOSE METER) w/Device KIT 1 each by Does not apply route in the morning, 
Virus (RSV) Pregnant or age 60 yrs+ (1 - Risk 60-74 years 1-dose series) Never done    COVID-19 Vaccine (1 - 2024-25 season) Never done    Annual Wellness Visit (Medicare)  06/12/2025    A1C test (Diabetic or Prediabetic)  08/07/2025   .        \"Have you been to the ER, urgent care clinic since your last visit?  Hospitalized since your last visit?\"    NO    “Have you seen or consulted any other health care providers outside of [facility] since your last visit?”    NO            Click Here for Release of Records Request

## 2025-07-07 NOTE — ASSESSMENT & PLAN NOTE
LDL 69 in May, continue rosuvastatin 10 mg nightly  Repeat A1c due in November    Orders:    rosuvastatin (CRESTOR) 10 MG tablet; Take 1 tablet by mouth daily

## 2025-07-07 NOTE — ASSESSMENT & PLAN NOTE
Thyroid function stable in May, continue levothyroxine 25 mcg daily  Repeat thyroid function labs due in November

## 2025-07-07 NOTE — ASSESSMENT & PLAN NOTE
GFR 23 in May, +1 edema to bilateral feet, advised pt and daughter to wear compression stockings daily  Continue to follow up with nephrology, Dr. Choi, with [facility]

## 2025-07-07 NOTE — ASSESSMENT & PLAN NOTE
BP at goal of <130/80, continue amlodipine 5 mg daily, carvedilol 25 mg BID, bumex 1 mg BID (for CHF)

## 2025-07-07 NOTE — ASSESSMENT & PLAN NOTE
A1c 11.1 in May, continue ozempic 0.5 mg weekly and insulin glargine 46 units daily  Advised pt on lifestyle modifications  Continue to f/u with pharmacy for assistance with management  Ordered dexcom per daughter's request    Orders:    Continuous Glucose  (DEXCOM G7 ) SHANICE; Use as directed    Continuous Glucose Sensor (DEXCOM G7 SENSOR) MISC; Apply 1 sensor every 10 days. Use as directed.

## 2025-07-07 NOTE — ASSESSMENT & PLAN NOTE
Symptomatic, reports intermittent dizziness  Continue aspirin 81 mg daily, and rosuvastatin 10 mg nightly  Consult vascular for further evaluation and management    Orders:    External Referral To Vascular Surgery

## 2025-07-07 NOTE — ASSESSMENT & PLAN NOTE
Reviewed 4/10/25 cardiology notes, [facility]. Plan: decreased amlodipine to 5 daily d/t edema, increase carvedilol to 25 mg BID, continue bumex 1 mg BID, check STAT PVLs of LE d/t significant edema, continue carvedilol 12.5 mg BID, continue dosuvastatin, hydralazine discontinued by [facility] provider, and started on synthroid, restarted farxiga d/t edema  Appears compensated, +2 edema to BLE, PVL BLE- negative for DVT, continue to f/u with cardiology for management    Orders:    FARXIGA 10 MG tablet; Take 1 tablet by mouth daily

## 2025-07-15 NOTE — PROGRESS NOTES
Pharmacy Progress Note - Diabetes Management       Assessment / Plan:   Diabetes Management:  Per ADA guidelines, Pt's A1c is not at goal of < 7%.  Pt's reported FBG values are wnl the majority of the time, but her NFBG values are all elevated.  However, the NFBG values are only taken with hyperglycemia sx.  She is having GI issues with Ozempic at 1mg weekly so will decrease to 0.5mg weekly.  Will send another Rx for Dexcom G7 sensors and  to better assess glycemic control.  Will reassess with CGM data in 3 weeks.    Hyperlipidemia:  The ASCVD Risk score (Jaycob KOLB, et al., 2019) failed to calculate for the following reasons:    The valid total cholesterol range is 130 to 320 mg/dL    Unable to determine if patient is Non-  based on parameters listed. Current lipid treatment guidelines recommend at least moderate-intensity statin doses for all patients with diabetes to decrease overall ASCVD risk. Patient currently qualifies for a moderate intensity statin therapy based on current recommendations and is currently taking a moderate intensity statin.     Nutrition/Lifestyle Modifications:  - Educated pt on the importance of moderating carbohydrate intake. Reviewed sources of carbohydrates and method to help determine appropriate portion sizes (e.g., Diabetes Plate Method).  - Advised patient to avoid sugar-sweetened beverages and replace with water or diet/zero sugar option.  - Recommend ~30 minutes consistent, moderately intensive, exercise/day or ~150 minutes/week. Start small, stay consistent, and increase length and types of exercise, as tolerated.       Patient will return to clinic in 3 week(s) for follow up.        S/O: Ms. Joie Rivas, a 69 y.o. female referred by Cheryl Puga NP-C,  has a past medical history of OLEG (acute kidney injury), CHF (congestive heart failure) (Former[facility]), Diabetes (HCC), Hypertension, and NSTEMI (non-ST elevated myocardial infarction) (Former[facility]).  Pt

## 2025-07-21 ENCOUNTER — TELEPHONE (OUTPATIENT)
Facility: CLINIC | Age: 70
End: 2025-07-21

## 2025-07-21 ENCOUNTER — PHARMACY VISIT (OUTPATIENT)
Facility: CLINIC | Age: 70
End: 2025-07-21

## 2025-07-21 DIAGNOSIS — Z79.4 TYPE 2 DIABETES MELLITUS WITH HYPERGLYCEMIA, WITH LONG-TERM CURRENT USE OF INSULIN (HCC): Primary | ICD-10-CM

## 2025-07-21 DIAGNOSIS — E11.65 TYPE 2 DIABETES MELLITUS WITH HYPERGLYCEMIA, WITH LONG-TERM CURRENT USE OF INSULIN (HCC): ICD-10-CM

## 2025-07-21 DIAGNOSIS — Z79.4 TYPE 2 DIABETES MELLITUS WITH HYPERGLYCEMIA, WITH LONG-TERM CURRENT USE OF INSULIN (HCC): ICD-10-CM

## 2025-07-21 DIAGNOSIS — E11.65 TYPE 2 DIABETES MELLITUS WITH HYPERGLYCEMIA, WITH LONG-TERM CURRENT USE OF INSULIN (HCC): Primary | ICD-10-CM

## 2025-07-21 RX ORDER — ACYCLOVIR 400 MG/1
TABLET ORAL
Qty: 3 EACH | Refills: 11 | Status: SHIPPED | OUTPATIENT
Start: 2025-07-21 | End: 2025-07-21 | Stop reason: SDUPTHER

## 2025-07-21 RX ORDER — ACYCLOVIR 400 MG/1
TABLET ORAL
Qty: 1 EACH | Refills: 0 | Status: SHIPPED | OUTPATIENT
Start: 2025-07-21

## 2025-07-21 RX ORDER — CALCIUM CITRATE/VITAMIN D3 200MG-6.25
TABLET ORAL
Qty: 400 EACH | Refills: 3 | Status: SHIPPED | OUTPATIENT
Start: 2025-07-21

## 2025-07-21 RX ORDER — INSULIN GLARGINE-YFGN 100 [IU]/ML
46 INJECTION, SOLUTION SUBCUTANEOUS EVERY MORNING
Qty: 15 ML | Refills: 3 | Status: SHIPPED | OUTPATIENT
Start: 2025-07-21

## 2025-07-21 RX ORDER — SEMAGLUTIDE 0.68 MG/ML
0.5 INJECTION, SOLUTION SUBCUTANEOUS
Qty: 3 ML | Refills: 1 | Status: SHIPPED | OUTPATIENT
Start: 2025-07-21

## 2025-07-21 RX ORDER — SEMAGLUTIDE 1.34 MG/ML
1 INJECTION, SOLUTION SUBCUTANEOUS
COMMUNITY
End: 2025-07-21 | Stop reason: DRUGHIGH

## 2025-07-21 NOTE — TELEPHONE ENCOUNTER
Continuous Glucose Sensor (DEXCOM G7 SENSOR) MISC [[id number]]      [id number] - [place], [place] - [address] - P [phone] - F [phone]  [address], [facility] [address]  Phone: [phone]  Fax: [phone]          Patients daughter is attempting to get an update on the dexcom that was ordered. Please contact with update

## 2025-07-22 RX ORDER — ACYCLOVIR 400 MG/1
TABLET ORAL
Qty: 3 EACH | Refills: 11 | Status: SHIPPED | OUTPATIENT
Start: 2025-07-22 | End: 2025-07-25 | Stop reason: SDUPTHER

## 2025-07-24 NOTE — TELEPHONE ENCOUNTER
Insurance states it needs to specify that the patient meet the criteria or else they wont be able to cover the device. Asked to include that information and resubmit it to the pharmacy.

## 2025-07-25 RX ORDER — ACYCLOVIR 400 MG/1
TABLET ORAL
Qty: 3 EACH | Refills: 11 | Status: SHIPPED | OUTPATIENT
Start: 2025-07-25

## 2025-07-25 RX ORDER — ACYCLOVIR 400 MG/1
TABLET ORAL
Qty: 3 EACH | Refills: 11 | Status: SHIPPED | OUTPATIENT
Start: 2025-07-25 | End: 2025-07-25

## 2025-08-06 DIAGNOSIS — I50.32 CHRONIC HEART FAILURE WITH PRESERVED EJECTION FRACTION (HCC): ICD-10-CM

## 2025-08-06 DIAGNOSIS — D63.1 ANEMIA DUE TO STAGE 4 CHRONIC KIDNEY DISEASE (HCC): ICD-10-CM

## 2025-08-06 DIAGNOSIS — N18.32 HYPERTENSIVE HEART AND KIDNEY DISEASE WITH CHRONIC SYSTOLIC CONGESTIVE HEART FAILURE AND STAGE 3B CHRONIC KIDNEY DISEASE (HCC): ICD-10-CM

## 2025-08-06 DIAGNOSIS — I50.22 HYPERTENSIVE HEART AND KIDNEY DISEASE WITH CHRONIC SYSTOLIC CONGESTIVE HEART FAILURE AND STAGE 3B CHRONIC KIDNEY DISEASE (HCC): ICD-10-CM

## 2025-08-06 DIAGNOSIS — I13.0 HYPERTENSIVE HEART AND KIDNEY DISEASE WITH CHRONIC SYSTOLIC CONGESTIVE HEART FAILURE AND STAGE 3B CHRONIC KIDNEY DISEASE (HCC): ICD-10-CM

## 2025-08-06 DIAGNOSIS — N18.4 ANEMIA DUE TO STAGE 4 CHRONIC KIDNEY DISEASE (HCC): ICD-10-CM

## 2025-08-07 RX ORDER — DAPAGLIFLOZIN 10 MG/1
10 TABLET, FILM COATED ORAL DAILY
Qty: 90 TABLET | Refills: 1 | Status: SHIPPED | OUTPATIENT
Start: 2025-08-07

## 2025-08-07 RX ORDER — FERROUS SULFATE 325(65) MG
325 TABLET ORAL
Qty: 90 TABLET | Refills: 1 | Status: SHIPPED | OUTPATIENT
Start: 2025-08-07

## 2025-08-07 RX ORDER — SOLIFENACIN SUCCINATE 10 MG/1
5 TABLET, FILM COATED ORAL DAILY
Qty: 90 TABLET | Refills: 1 | OUTPATIENT
Start: 2025-08-07

## 2025-08-15 ENCOUNTER — TELEPHONE (OUTPATIENT)
Facility: CLINIC | Age: 70
End: 2025-08-15

## 2025-08-20 ENCOUNTER — HOSPITAL ENCOUNTER (OUTPATIENT)
Age: 70
Discharge: HOME | End: 2025-08-20
Payer: MEDICARE

## 2025-08-20 DIAGNOSIS — E11.65 TYPE 2 DIABETES MELLITUS WITH HYPERGLYCEMIA, WITHOUT LONG-TERM CURRENT USE OF INSULIN (HCC): ICD-10-CM

## 2025-08-20 DIAGNOSIS — N18.4 STAGE 4 CHRONIC KIDNEY DISEASE (HCC): ICD-10-CM

## 2025-08-20 DIAGNOSIS — N18.32 HYPERTENSIVE HEART AND KIDNEY DISEASE WITH CHRONIC SYSTOLIC CONGESTIVE HEART FAILURE AND STAGE 3B CHRONIC KIDNEY DISEASE (HCC): ICD-10-CM

## 2025-08-20 DIAGNOSIS — I50.22 HYPERTENSIVE HEART AND KIDNEY DISEASE WITH CHRONIC SYSTOLIC CONGESTIVE HEART FAILURE AND STAGE 3B CHRONIC KIDNEY DISEASE (HCC): ICD-10-CM

## 2025-08-20 DIAGNOSIS — E78.5 HYPERLIPIDEMIA ASSOCIATED WITH TYPE 2 DIABETES MELLITUS (HCC): ICD-10-CM

## 2025-08-20 DIAGNOSIS — I13.0 HYPERTENSIVE HEART AND KIDNEY DISEASE WITH CHRONIC SYSTOLIC CONGESTIVE HEART FAILURE AND STAGE 3B CHRONIC KIDNEY DISEASE (HCC): ICD-10-CM

## 2025-08-20 DIAGNOSIS — E11.69 HYPERLIPIDEMIA ASSOCIATED WITH TYPE 2 DIABETES MELLITUS (HCC): ICD-10-CM

## 2025-08-20 LAB
ALBUMIN SERPL-MCNC: 3.4 G/DL (ref 3.4–5)
ALBUMIN/GLOB SERPL: 1.1 (ref 0.8–1.7)
ALP SERPL-CCNC: 70 U/L (ref 45–117)
ALT SERPL-CCNC: 15 U/L (ref 10–35)
ANION GAP SERPL CALC-SCNC: 12 MMOL/L (ref 3–18)
AST SERPL-CCNC: 18 U/L (ref 10–38)
BASOPHILS # BLD: 0.02 K/UL (ref 0–0.1)
BASOPHILS NFR BLD: 0.4 % (ref 0–2)
BILIRUB SERPL-MCNC: 0.2 MG/DL (ref 0.2–1)
BUN SERPL-MCNC: 46 MG/DL (ref 6–23)
BUN/CREAT SERPL: 21 (ref 12–20)
CALCIUM SERPL-MCNC: 8.9 MG/DL (ref 8.5–10.1)
CHLORIDE SERPL-SCNC: 100 MMOL/L (ref 98–107)
CO2 SERPL-SCNC: 27 MMOL/L (ref 21–32)
CREAT SERPL-MCNC: 2.23 MG/DL (ref 0.6–1.3)
DIFFERENTIAL METHOD BLD: ABNORMAL
EOSINOPHIL # BLD: 0.31 K/UL (ref 0–0.4)
EOSINOPHIL NFR BLD: 7 % (ref 0–5)
ERYTHROCYTE [DISTWIDTH] IN BLOOD BY AUTOMATED COUNT: 13.3 % (ref 11.6–14.5)
EST. AVERAGE GLUCOSE BLD GHB EST-MCNC: 219 MG/DL
GLOBULIN SER CALC-MCNC: 3.1 G/DL (ref 2–4)
GLUCOSE SERPL-MCNC: 255 MG/DL (ref 74–108)
HBA1C MFR BLD: 9.3 % (ref 4.2–5.6)
HCT VFR BLD AUTO: 36.8 % (ref 35–45)
HGB BLD-MCNC: 11.5 G/DL (ref 12–16)
IMM GRANULOCYTES # BLD AUTO: 0.01 K/UL (ref 0–0.04)
IMM GRANULOCYTES NFR BLD AUTO: 0.2 % (ref 0–0.5)
LYMPHOCYTES # BLD: 1.88 K/UL (ref 0.9–3.6)
LYMPHOCYTES NFR BLD: 42.2 % (ref 21–52)
MCH RBC QN AUTO: 25 PG (ref 24–34)
MCHC RBC AUTO-ENTMCNC: 31.3 G/DL (ref 31–37)
MCV RBC AUTO: 80 FL (ref 78–100)
MONOCYTES # BLD: 0.39 K/UL (ref 0.05–1.2)
MONOCYTES NFR BLD: 8.7 % (ref 3–10)
NEUTS SEG # BLD: 1.85 K/UL (ref 1.8–8)
NEUTS SEG NFR BLD: 41.5 % (ref 40–73)
NRBC # BLD: 0 K/UL (ref 0–0.01)
NRBC BLD-RTO: 0 PER 100 WBC
PLATELET # BLD AUTO: 219 K/UL (ref 135–420)
PMV BLD AUTO: 12.4 FL (ref 9.2–11.8)
POTASSIUM SERPL-SCNC: 4.9 MMOL/L (ref 3.5–5.5)
PROT SERPL-MCNC: 6.5 G/DL (ref 6.4–8.2)
RBC # BLD AUTO: 4.6 M/UL (ref 4.2–5.3)
SODIUM SERPL-SCNC: 139 MMOL/L (ref 136–145)
WBC # BLD AUTO: 4.5 K/UL (ref 4.6–13.2)

## 2025-08-20 PROCEDURE — 80053 COMPREHEN METABOLIC PANEL: CPT

## 2025-08-20 PROCEDURE — 36415 COLL VENOUS BLD VENIPUNCTURE: CPT

## 2025-08-20 PROCEDURE — 83036 HEMOGLOBIN GLYCOSYLATED A1C: CPT

## 2025-08-20 PROCEDURE — 85025 COMPLETE CBC W/AUTO DIFF WBC: CPT

## 2025-09-03 RX ORDER — SOLIFENACIN SUCCINATE 10 MG/1
5 TABLET, FILM COATED ORAL DAILY
Qty: 45 TABLET | Refills: 1 | OUTPATIENT
Start: 2025-09-03

## 2025-09-05 ENCOUNTER — OFFICE VISIT (OUTPATIENT)
Facility: CLINIC | Age: 70
End: 2025-09-05
Payer: MEDICARE

## 2025-09-05 ENCOUNTER — TELEPHONE (OUTPATIENT)
Facility: CLINIC | Age: 70
End: 2025-09-05

## 2025-09-05 VITALS
WEIGHT: 208 LBS | HEART RATE: 75 BPM | RESPIRATION RATE: 14 BRPM | SYSTOLIC BLOOD PRESSURE: 107 MMHG | DIASTOLIC BLOOD PRESSURE: 68 MMHG | TEMPERATURE: 97.3 F | HEIGHT: 60 IN | OXYGEN SATURATION: 96 % | BODY MASS INDEX: 40.84 KG/M2

## 2025-09-05 DIAGNOSIS — I50.22 HYPERTENSIVE HEART AND KIDNEY DISEASE WITH CHRONIC SYSTOLIC CONGESTIVE HEART FAILURE AND STAGE 3B CHRONIC KIDNEY DISEASE (HCC): Primary | ICD-10-CM

## 2025-09-05 DIAGNOSIS — I50.32 CHRONIC HEART FAILURE WITH PRESERVED EJECTION FRACTION (HCC): ICD-10-CM

## 2025-09-05 DIAGNOSIS — N18.32 HYPERTENSIVE HEART AND KIDNEY DISEASE WITH CHRONIC SYSTOLIC CONGESTIVE HEART FAILURE AND STAGE 3B CHRONIC KIDNEY DISEASE (HCC): Primary | ICD-10-CM

## 2025-09-05 DIAGNOSIS — E11.65 TYPE 2 DIABETES MELLITUS WITH HYPERGLYCEMIA, WITHOUT LONG-TERM CURRENT USE OF INSULIN (HCC): ICD-10-CM

## 2025-09-05 DIAGNOSIS — E78.5 HYPERLIPIDEMIA ASSOCIATED WITH TYPE 2 DIABETES MELLITUS (HCC): ICD-10-CM

## 2025-09-05 DIAGNOSIS — N18.31 STAGE 3A CHRONIC KIDNEY DISEASE (HCC): ICD-10-CM

## 2025-09-05 DIAGNOSIS — E11.69 HYPERLIPIDEMIA ASSOCIATED WITH TYPE 2 DIABETES MELLITUS (HCC): ICD-10-CM

## 2025-09-05 DIAGNOSIS — I13.0 HYPERTENSIVE HEART AND KIDNEY DISEASE WITH CHRONIC SYSTOLIC CONGESTIVE HEART FAILURE AND STAGE 3B CHRONIC KIDNEY DISEASE (HCC): Primary | ICD-10-CM

## 2025-09-05 DIAGNOSIS — M25.512 ACUTE PAIN OF LEFT SHOULDER: ICD-10-CM

## 2025-09-05 DIAGNOSIS — Z71.2 ENCOUNTER TO DISCUSS TEST RESULTS: ICD-10-CM

## 2025-09-05 PROCEDURE — 3017F COLORECTAL CA SCREEN DOC REV: CPT

## 2025-09-05 PROCEDURE — G8417 CALC BMI ABV UP PARAM F/U: HCPCS

## 2025-09-05 PROCEDURE — 2022F DILAT RTA XM EVC RTNOPTHY: CPT

## 2025-09-05 PROCEDURE — 1123F ACP DISCUSS/DSCN MKR DOCD: CPT

## 2025-09-05 PROCEDURE — 1160F RVW MEDS BY RX/DR IN RCRD: CPT

## 2025-09-05 PROCEDURE — 1159F MED LIST DOCD IN RCRD: CPT

## 2025-09-05 PROCEDURE — 99215 OFFICE O/P EST HI 40 MIN: CPT

## 2025-09-05 PROCEDURE — G8399 PT W/DXA RESULTS DOCUMENT: HCPCS

## 2025-09-05 PROCEDURE — 1090F PRES/ABSN URINE INCON ASSESS: CPT

## 2025-09-05 PROCEDURE — 3046F HEMOGLOBIN A1C LEVEL >9.0%: CPT

## 2025-09-05 PROCEDURE — 1036F TOBACCO NON-USER: CPT

## 2025-09-05 PROCEDURE — G8427 DOCREV CUR MEDS BY ELIG CLIN: HCPCS

## 2025-09-05 RX ORDER — INSULIN GLARGINE-YFGN 100 [IU]/ML
46 INJECTION, SOLUTION SUBCUTANEOUS EVERY MORNING
Qty: 15 ML | Refills: 3 | Status: SHIPPED | OUTPATIENT
Start: 2025-09-05 | End: 2025-09-05 | Stop reason: ALTCHOICE

## 2025-09-05 RX ORDER — BUMETANIDE 1 MG/1
0.5 TABLET ORAL 2 TIMES DAILY
Qty: 45 TABLET | Refills: 1 | Status: SHIPPED | OUTPATIENT
Start: 2025-09-05

## 2025-09-05 RX ORDER — INSULIN GLARGINE 100 [IU]/ML
46 INJECTION, SOLUTION SUBCUTANEOUS EVERY MORNING
Qty: 5 ADJUSTABLE DOSE PRE-FILLED PEN SYRINGE | Refills: 3 | Status: SHIPPED | OUTPATIENT
Start: 2025-09-05

## 2025-09-05 RX ORDER — SOLIFENACIN SUCCINATE 10 MG/1
10 TABLET, FILM COATED ORAL DAILY
Qty: 30 TABLET | OUTPATIENT
Start: 2025-09-05

## 2025-09-05 SDOH — ECONOMIC STABILITY: FOOD INSECURITY: WITHIN THE PAST 12 MONTHS, THE FOOD YOU BOUGHT JUST DIDN'T LAST AND YOU DIDN'T HAVE MONEY TO GET MORE.: PATIENT DECLINED

## 2025-09-05 SDOH — ECONOMIC STABILITY: FOOD INSECURITY: WITHIN THE PAST 12 MONTHS, YOU WORRIED THAT YOUR FOOD WOULD RUN OUT BEFORE YOU GOT MONEY TO BUY MORE.: PATIENT DECLINED

## 2025-09-05 ASSESSMENT — PATIENT HEALTH QUESTIONNAIRE - PHQ9
SUM OF ALL RESPONSES TO PHQ QUESTIONS 1-9: 0
SUM OF ALL RESPONSES TO PHQ QUESTIONS 1-9: 0
1. LITTLE INTEREST OR PLEASURE IN DOING THINGS: NOT AT ALL
SUM OF ALL RESPONSES TO PHQ QUESTIONS 1-9: 0
SUM OF ALL RESPONSES TO PHQ QUESTIONS 1-9: 0
2. FEELING DOWN, DEPRESSED OR HOPELESS: NOT AT ALL

## 2025-09-05 ASSESSMENT — ENCOUNTER SYMPTOMS: SHORTNESS OF BREATH: 0
